# Patient Record
Sex: MALE | Race: WHITE | NOT HISPANIC OR LATINO | Employment: FULL TIME | URBAN - NONMETROPOLITAN AREA
[De-identification: names, ages, dates, MRNs, and addresses within clinical notes are randomized per-mention and may not be internally consistent; named-entity substitution may affect disease eponyms.]

---

## 2018-04-23 ENCOUNTER — OFFICE VISIT CONVERTED (OUTPATIENT)
Dept: FAMILY MEDICINE CLINIC | Age: 58
End: 2018-04-23
Attending: NURSE PRACTITIONER

## 2018-04-24 LAB
ALBUMIN SERPL-MCNC: 4.5 G/DL
ALBUMIN/GLOB SERPL: 2 {RATIO}
ALP SERPL-CCNC: 90 IU/L
ALT SERPL-CCNC: 13 IU/L
AST SERPL-CCNC: 15 IU/L
BASOPHILS # BLD AUTO: 0 X10E3/UL
BASOPHILS NFR BLD AUTO: 1 %
BILIRUB SERPL-MCNC: 0.3 MG/DL
BUN SERPL-MCNC: 15 MG/DL
BUN/CREAT SERPL: 19
CALCIUM SERPL-MCNC: 9.3 MG/DL
CHLORIDE SERPL-SCNC: 102 MMOL/L
CHOLEST SERPL-MCNC: 159 MG/DL
CO2 SERPL-SCNC: 24 MMOL/L
CONV IMMATURE GRAN: 0 %
CONV TOTAL PROTEIN: 6.7 G/DL
CREAT UR-MCNC: 0.8 MG/DL
EOSINOPHIL # BLD AUTO: 0.2 X10E3/UL
EOSINOPHIL # BLD AUTO: 4 %
ERYTHROCYTE [DISTWIDTH] IN BLOOD BY AUTOMATED COUNT: 13.6 %
GLOBULIN UR ELPH-MCNC: 2.2 G/DL
GLUCOSE SERPL-MCNC: 112 MG/DL
HCT VFR BLD AUTO: 50.8 %
HCV AB SER DONR QL: <0.1 S/CO RATIO
HDLC SERPL-MCNC: 44 MG/DL
HGB BLD-MCNC: 17.3 G/DL
IMM GRANULOCYTES # BLD: 0 X10E3/UL
LDLC SERPL CALC-MCNC: 103 MG/DL
LYMPHOCYTES # BLD AUTO: 1.1 X10E3/UL
LYMPHOCYTES NFR BLD AUTO: 19 %
MCH RBC QN AUTO: 29.6 PG
MCHC RBC AUTO-ENTMCNC: 34.1 G/DL
MCV RBC AUTO: 87 FL
MONOCYTES # BLD AUTO: 0.5 X10E3/UL
MONOCYTES NFR BLD AUTO: 8 %
NEUTROPHILS # BLD AUTO: 4 X10E3/UL
NEUTROPHILS NFR BLD AUTO: 68 %
PLATELET # BLD AUTO: 305 X10E3/UL
POTASSIUM SERPL-SCNC: 5 MMOL/L
PSA SERPL-MCNC: 1.2 NG/ML
RBC # BLD AUTO: 5.84 X10E6/UL
SODIUM SERPL-SCNC: 141 MMOL/L
TRIGL SERPL-MCNC: 59 MG/DL
TSH SERPL-ACNC: 1.94 UIU/ML
VLDLC SERPL-MCNC: 12 MG/DL
WBC # BLD AUTO: 5.8 X10E3/UL

## 2018-05-14 ENCOUNTER — OFFICE VISIT CONVERTED (OUTPATIENT)
Dept: FAMILY MEDICINE CLINIC | Age: 58
End: 2018-05-14
Attending: NURSE PRACTITIONER

## 2019-05-10 ENCOUNTER — OFFICE VISIT CONVERTED (OUTPATIENT)
Dept: FAMILY MEDICINE CLINIC | Age: 59
End: 2019-05-10
Attending: NURSE PRACTITIONER

## 2020-03-20 ENCOUNTER — CONVERSION ENCOUNTER (OUTPATIENT)
Dept: FAMILY MEDICINE CLINIC | Age: 60
End: 2020-03-20

## 2020-03-20 ENCOUNTER — OFFICE VISIT CONVERTED (OUTPATIENT)
Dept: FAMILY MEDICINE CLINIC | Age: 60
End: 2020-03-20
Attending: FAMILY MEDICINE

## 2020-03-30 LAB — SARS-COV-2 RNA RESP QL NAA+PROBE: NOT DETECTED

## 2021-05-18 NOTE — PROGRESS NOTES
Teo BarcenasAmber 1960     Office/Outpatient Visit    Visit Date: Mon, May 14, 2018 01:21 pm    Provider: Giuliana Dow N.P. (Assistant: Cristina Baeza MA)    Location: [facility]        Electronically signed by Giuliana Dow N.P. on  2018 02:14:18 PM                             SUBJECTIVE:        CC:     Mr. Barcenas is a 57 year old White male.  Patient is here for instability with his blood pressure. He is seeking treatment to get this regulated.          HPI:         Complaint of blood pressure elevation without a diagnosis of HTN..  The symptom began 2 days ago.  The severity is of severe intensity.  Aggravating factors include emotional stress and having confrontation with a supervisor at work - causes his BP to go high - causing a HA and dizziness.  no chest pain - only happens when he is having to deal with this one particular person     ROS:     CONSTITUTIONAL:  Negative for chills, fatigue and fever.      CARDIOVASCULAR:  Negative for chest pain and pedal edema.      RESPIRATORY:  Negative for recent cough and dyspnea.      NEUROLOGICAL:  Positive for dizziness and headaches.      PSYCHIATRIC:  Positive for anxiety and feelings of stress ( (work with dealing with a supervisor) ).   Negative for crying spells, depression, personality change, difficulty concentrating or suicidal thoughts.          PMH/FMH/SH:     Last Reviewed on 2018 01:55 PM by Giuliana Dow    Past Medical History:             PAST MEDICAL HISTORY         cardiomyopathy     Melanoma: dx'd in ;         PREVENTIVE HEALTH MAINTENANCE             Hepatitis C Medicare Screening: was last done 18; negative         Surgical History:         Appendectomy    Cholecystectomy      melanoma;         Family History:     Father:  at age 68; Cause of death was CVA     Mother: Ovarian Cancer         Social History:     Occupation: [employer]     Marital Status:      Children: 3 children and 2 step-children          Tobacco/Alcohol/Supplements:     Last Reviewed on 5/14/2018 01:22 PM by Cristina Baeza    Tobacco: He has a past history of cigarette smoking; quit date:  2011.   He currently uses smokeless tobacco, 3 pouches of a can per day cans/pouches daily..          Alcohol: Patient has a history of alcoholism in the past. Frequency: Socially         Substance Abuse History:     Last Reviewed on 6/17/2016 09:28 AM by Anne Talley    NEGATIVE         Mental Health History:     Last Reviewed on 6/17/2016 09:28 AM by Anne Talley        Communicable Diseases (eg STDs):     Last Reviewed on 6/17/2016 09:28 AM by Anne Talley            Current Problems:     Last Reviewed on 5/14/2018 01:55 PM by Giuliana Dow    History of malignant melanoma of skin     Low back pain     CAD     Acute reaction to stress, unspecified     Blood pressure elevation without a diagnosis of HTN     Screening for colon cancer         Immunizations:     None        Allergies:     Last Reviewed on 5/14/2018 01:21 PM by Cristina Baeza      No Known Drug Allergies.         Current Medications:     Last Reviewed on 5/14/2018 01:21 PM by Cristina Baeza    None        OBJECTIVE:        Vitals:         Historical:     04/23/2018  BP:   140/88 mm Hg ( (left arm, , sitting, );)     04/23/2018  BP:   140/82 mm Hg ( (left arm, , sitting, );)         Current: 5/14/2018 1:23:41 PM    Ht:  5 ft, 7 in;  Wt: 180.4 lbs;  BMI: 28.3    T: 98.1 F (oral);  BP: 128/77 mm Hg (left arm, sitting);  P: 66 bpm (left arm (BP Cuff), sitting);  sCr: 0.8 mg/dL;  GFR: 94.53        Exams:     PHYSICAL EXAM:     GENERAL: Vitals recorded well developed, well nourished;  no apparent distress;     NECK: range of motion is normal;     RESPIRATORY: normal appearance and symmetric expansion of chest wall; normal respiratory rate and pattern with no distress; normal breath sounds with no rales, rhonchi, wheezes or rubs;     CARDIOVASCULAR: normal rate; rhythm is  regular;     LYMPHATIC: no enlargement of cervical or facial nodes; no supraclavicular nodes;     MUSCULOSKELETAL: normal gait; normal range of motion of all major muscle groups; no limb or joint pain with range of motion;     NEUROLOGIC: mental status: alert and oriented x 3;     PSYCHIATRIC: appropriate affect and demeanor; normal speech pattern; normal thought and perception;         ASSESSMENT           796.2   R03.0  Blood pressure elevation without a diagnosis of HTN              DDx:     308.9   F43.0  Acute reaction to stress, unspecified              DDx:         PLAN:          Blood pressure elevation without a diagnosis of HTN monitor BP at home - I do not see a trend of elevation - he has had one elevation here  - I do recommend that he monitor and bring a log in in 3 months unless staying elevated over 140/90 - He feels that if he is able to have a break from work unti the current situation is resolved, this may help him out          Acute reaction to stress, unspecified     School/Work Excuse for Off work 5/13 - 5/18/18  RTW Saturday 5/19/18             CHARGE CAPTURE           **Please note: ICD descriptions below are intended for billing purposes only and may not represent clinical diagnoses**        Primary Diagnosis:         796.2 Blood pressure elevation without a diagnosis of HTN            R03.0    Elevated blood-pressure reading, without diagnosis of hypertension              Orders:          53633   Office/outpatient visit; established patient, level 3  (In-House)           308.9 Acute reaction to stress, unspecified            F43.0    Acute stress reaction

## 2021-05-18 NOTE — PROGRESS NOTES
"Teo Barcenas 1960     Office/Outpatient Visit    Visit Date: Mon, Apr 23, 2018 09:44 am    Provider: Loree Baeza N.P. (Assistant: Belinda Elias MA)    Location: [facility]        Electronically signed by Loree Baeza N.P. on  04/23/2018 12:50:33 PM                             SUBJECTIVE:        CC:     Mr. Barcenas is a 57 year old White male.  He is here for an annual exam.          HPI:         HEALTH RISK PROFILE (\"At Risk\" items are starred):     Weight: ** Overweight (BMI 27-29%);     Blood Pressure: ** Borderline (-140, DBP 80-90);     Lipids: Never had cholesterol screening test;     Smoking: ** Past history of tobacco use; Currently uses dip; Depression screen is performed and is negative.      Cancer Screening: ** Has never had a screening flexible sigmoidoscopy; ** (+) Family history of colon cancer;     Immunization Status: Up to date;     Nutrition: ** Weight is above the healthy range for height; eats fast food;     Physical Activity: ** Exercises infrequently;     Alcohol/Drug Use: Rarely drinks alcohol;     Safety: Always wears seatbelt;  Never drinks alcohol and then drives;  Guns are kept locked up, and ammunition is stored in a separate location;  Smoke detectors are present in the home;     Dental Health: Brushes daily; ** Uses tobacco products;     Skin Exams: ** Does not use sunscreen;     ROS:     CONSTITUTIONAL:  Negative for chills and fever.      EYES:  Negative for blurred vision.      E/N/T:  Negative for nasal congestion and sore throat.      CARDIOVASCULAR:  Negative for chest pain and palpitations.      RESPIRATORY:  Negative for recent cough and dyspnea.      GASTROINTESTINAL:  Negative for abdominal pain, nausea and vomiting.      MUSCULOSKELETAL:  Negative for myalgias.      INTEGUMENTARY:  Positive for rash (facial rash, chronic, left arm itches).   Negative for atypical mole(s).      NEUROLOGICAL:  Negative for paresthesias and weakness.      " PSYCHIATRIC:  Positive for feelings of stress ( (work related) ).   Negative for anxiety or depression.          PMH/FMH/SH:     Last Reviewed on 4/23/2018 10:55 AM by Loree Baeza    Past Medical History:             PAST MEDICAL HISTORY         Myocardial Infarction     Melanoma: dx'd in 2012;         Surgical History:         Appendectomy    Cholecystectomy      melanoma;         Family History:     Father: Cause of death was CAD     Mother: Ovarian Cancer         Social History:     Occupation: [employer]     Marital Status:      Children: 3 children and 2 step-children         Tobacco/Alcohol/Supplements:     Last Reviewed on 4/23/2018 09:50 AM by Belinda Elias    Tobacco: He has a past history of cigarette smoking; quit date:  2011.   Currently uses smokeless tobacco.          Alcohol: Patient has a history of alcoholism in the past. Frequency: Socially             Immunizations:     None        Allergies:     Last Reviewed on 4/23/2018 10:50 AM by Loree Baeza      No Known Drug Allergies.         Current Medications:     Last Reviewed on 4/23/2018 10:50 AM by Loree Baeza    None        OBJECTIVE:        Vitals:         Current: 4/23/2018 9:49:49 AM    Ht:  5 ft, 7 in;  Wt: 183.6 lbs;  BMI: 28.8    T: 97.4 F (oral);  BP: 140/82 mm Hg (left arm, sitting);  P: 63 bpm (left arm (BP Cuff), sitting);  sCr: 1 mg/dL;  GFR: 76.19        Repeat:     10:56:05 AM     BP:   140/88mm Hg (left arm, sitting)         Exams:     PHYSICAL EXAM:     GENERAL: Vitals recorded well developed, well nourished;  well groomed;  no apparent distress;     EYES: extraocular movements intact; conjunctiva and cornea are normal; PERRLA;     E/N/T: OROPHARYNX:  normal mucosa, dentition, gingiva, and posterior pharynx;     NECK: range of motion is normal; thyroid exam reveals not enlarged;     RESPIRATORY: normal respiratory rate and pattern with no distress; normal breath sounds with no rales, rhonchi, wheezes or rubs;      CARDIOVASCULAR: normal rate; rhythm is regular;  no systolic murmur; no edema;     GASTROINTESTINAL: nontender; normal bowel sounds; no organomegaly;     LYMPHATIC: no enlargement of cervical or facial nodes;     SKIN: a rash is noted on the face ( forehead and nasolabial folds );  the color is mainly red;  it is best characterized as maculopapular;  15 cm Scar present on left flank;     MUSCULOSKELETAL: normal gait; normal overall tone     NEUROLOGIC: mental status: alert and oriented x 3; GROSSLY INTACT     PSYCHIATRIC:  appropriate affect and demeanor; normal speech pattern; grossly normal memory;         ASSESSMENT           V70.0   Z00.00  Annual exam              DDx:     V76.49   Z12.11  Screening for colon cancer              DDx:     V10.82   Z85.820  History of malignant melanoma of skin              DDx:     V69.8   Z72.89  Other problems related to lifestyle              DDx:         ORDERS:         Lab Orders:       [id number]  [id number] [facility] CBC with Differential/Platelet  (Send-Out)         [id number]  [id number] [facility] Comp Metabolic Panel (14)  (Send-Out)         [id number]  [id number] [facility] Lipid Panel (Excludes LDL/HDL ratio)  (Send-Out)         [id number]  [id number] [facility] Prostate-Specific Ag, Serum  (Send-Out)         [id number]  [id number] [facility] TSH  (Send-Out)         [id number]  [id number] [facility] Hepatitic C (HCV) Antibody Medicare screen  (Send-Out)           Procedures Ordered:       REFER  Referral to Specialist or Other Facility  (Send-Out)         REFER  Referral to Specialist or Other Facility  (Send-Out)                   PLAN:          Annual exam     LABORATORY:  Labs ordered to be performed today at [facility] include CBC, PSA.  CMP Lipid panel TSH MIPS PHQ-9 Depression Screening: Completed form scanned and in chart; Total Score 0     COUNSELING was provided today regarding the following topics: healthy eating habits, regular exercise, use of seat belts, use of sunscreen, and stop smokeless tobacco.      FOLLOW-UP: Schedule  follow-up appointments on a p.r.n. basis.            Orders:       [id number]  [id number] [facility] CBC with Differential/Platelet  (Send-Out)         [id number]  [id number] [facility] Comp Metabolic Panel (14)  (Send-Out)         94145  [id number] [facility] Lipid Panel (Excludes LDL/HDL ratio)  (Send-Out)         [id number]  [id number] [facility] Prostate-Specific Ag, Serum  (Send-Out)         [id number]  [id number] [facility] TSH  (Send-Out)             Patient Education Handouts:       Physical Exam 50-59 year, Male           Screening for colon cancer pt has family history of colon cancer (uncle)         REFERRALS:  Referral initiated to a general surgeon ( Dr. Javon Brush ).            Orders:       REFER  Referral to Specialist or Other Facility  (Send-Out)            History of malignant melanoma of skin Pt has seen Dr Yañez in the past but has not followed up like instructed after melanoma dx. and to have his rash evaluated         REFERRALS:  Referral initiated to a dermatologist ( Dr. Cari Yañez ).            Orders:       REFER  Referral to Specialist or Other Facility  (Send-Out)            Other problems related to lifestyle     LABORATORY:  Labs ordered to be performed today at [facility] include Hepatitis C Screening.            Orders:       [id number]  [id number] [facility] Hepatitic C (HCV) Antibody Medicare screen  (Send-Out)               Patient Recommendations:        For  Annual exam:     Limit dietary intake of fat (especially saturated fat) and cholesterol.  Eat a variety of foods, including plenty of fruits, vegetables, and grain containg fiber, limit fat intake to 30% of total calories. Balance caloric intake with energy expended. Maintaining regular physical activity is advised to help prevent heart disease, hypertension, diabetes, and obesity. Always use shoulder/lap restraints when driving or riding in a vehicle, even those equipped with air bags.  Schedule follow-up appointments as needed.              CHARGE CAPTURE           **Please note: ICD  descriptions below are intended for billing purposes only and may not represent clinical diagnoses**        Primary Diagnosis:         V70.0 Annual exam            Z00.00    Encounter for general adult medical examination without abnormal findings              Orders:          [id number]   Preventive medicine, established patient, age 40-64 years  (In-House)           V76.49 Screening for colon cancer            Z12.11    Encounter for screening for malignant neoplasm of colon    V10.82 History of malignant melanoma of skin            Z85.820    Personal history of malignant melanoma of skin    V69.8 Other problems related to lifestyle            Z72.89    Other problems related to lifestyle        ADDENDUMS:      ____________________________________    Date: 04/24/2018 10:29 AM    Author: Felisha Moreno         Visit Note Faxed to:        Javon Brush  (General Surgery); Number [phone]

## 2021-05-18 NOTE — PROGRESS NOTES
Teo BarcenasAmber 1960     Office/Outpatient Visit    Visit Date: Fri, May 10, 2019 01:16 pm    Provider: Светлана Ramos N.P. (Assistant: Vashti Pink LPN)    Location: [facility]        Electronically signed by Светлана Ramos N.P. on  05/10/2019 01:53:35 PM                             SUBJECTIVE:        CC:     Mr. Barcenas is a 58 year old White male.  Back pain that has been present x 2 days. No obvious injuries known.          HPI:         Mr. Barcenas presents with back pain.  The discomfort is most prominent in the mid lumbar spine.  This radiates to the left posterior thigh.  He characterizes it as stabbing.  This is a chronic, but intermittent problem with an acute exacerbation.  He states that the current episode of pain started 2 days ago.  Aggravating factors contributing to the back pain may be job-related repetitive lifting with back strain and lifting grandchild out of bathtub.  He notes some pain relief with apple cider vinegar.      ROS:     CONSTITUTIONAL:  Negative for chills and fever.      CARDIOVASCULAR:  Negative for chest pain and palpitations.      RESPIRATORY:  Negative for dyspnea and frequent wheezing.      GASTROINTESTINAL:  Negative for abdominal pain and vomiting.      MUSCULOSKELETAL:  Positive for back pain.      NEUROLOGICAL:  Negative for dizziness and headaches.          PMH/FMH/SH:     Last Reviewed on 2018 01:55 PM by Giuliana Dow    Past Medical History:             PAST MEDICAL HISTORY         cardiomyopathy     Melanoma: dx'd in ;         PREVENTIVE HEALTH MAINTENANCE             Hepatitis C Medicare Screening: was last done 18; negative         Surgical History:         Appendectomy    Cholecystectomy      melanoma;         Family History:     Father:  at age 68; Cause of death was CVA     Mother: Ovarian Cancer         Social History:     Occupation: [employer]     Marital Status:      Children: 3 children and 2 step-children          Tobacco/Alcohol/Supplements:     Last Reviewed on 5/14/2018 01:22 PM by Cristina Baeza    Tobacco: He has a past history of cigarette smoking; quit date:  2011.   He currently uses smokeless tobacco, 3 pouches of a can per day cans/pouches daily..          Alcohol: Patient has a history of alcoholism in the past. Frequency: Socially         Substance Abuse History:     Last Reviewed on 6/17/2016 09:28 AM by Anne Talley    NEGATIVE         Mental Health History:     Last Reviewed on 6/17/2016 09:28 AM by Anne Talley        Communicable Diseases (eg STDs):     Last Reviewed on 6/17/2016 09:28 AM by Anne Talley            Current Problems:     Last Reviewed on 5/14/2018 01:55 PM by Giuliana Dow    History of malignant melanoma of skin     Low back pain     CAD         Immunizations:     None        Allergies:     Last Reviewed on 5/10/2019 01:17 PM by Vashti Pink      No Known Drug Allergies.         Current Medications:     Last Reviewed on 5/10/2019 01:17 PM by Vashti Pink      No Known Medications.         OBJECTIVE:        Vitals:         Current: 5/10/2019 1:19:54 PM    Ht:  5 ft, 7 in;  Wt: 185.8 lbs;  BMI: 29.1    T: 97.8 F (oral);  BP: 139/85 mm Hg (left arm, sitting);  P: 61 bpm (left arm (BP Cuff), sitting);  sCr: 0.8 mg/dL;  GFR: 94.59        Exams:     PHYSICAL EXAM:     GENERAL: well developed, well nourished;  no apparent distress;     RESPIRATORY: normal respiratory rate and pattern with no distress; normal breath sounds with no rales, rhonchi, wheezes or rubs;     CARDIOVASCULAR: normal rate; rhythm is regular;     GASTROINTESTINAL: nontender; normal bowel sounds; no organomegaly;     MUSCULOSKELETAL: gait: slowed;  decreased range of motion noted in: back flexion, extension, and lateral flexion;  pain with range of motion in: back flexion, extension, and lateral flexion;     NEUROLOGIC: mental status: alert and oriented x 3; GROSSLY INTACT     PSYCHIATRIC: appropriate  affect and demeanor;         Procedures:     Back pain     1. Kenalog 40 mg given IM in the right hip; administered by AS;  lot number [id number]; expires 12/2019             ASSESSMENT           724.5   M54.9  Back pain              DDx:         ORDERS:         Meds Prescribed:       Diclofenac Sodium 75mg Tablets, Enteric Coated 1 tab bid with food prn pain.  do not take ibuprofen or tylenol while taking this medication.  #60 (Sixty) tablet(s) Refills: 0       Robaxin (Methocarbamol) 500mg Tablet take 1-2 tabs PO q6hrs/prn  #40 (Forty) tablet(s) Refills: 0         Other Orders:       [id number]  Therapeutic injection  (In-House)           Kenalog, per 10 mg (x4)                 PLAN:          Back pain Consider imaging, physical therapy if no improvement in symptoms.     Steroids Kenalog 40 mg 1 ml     FOLLOW-UP: Schedule follow-up appointments on a p.r.n. basis. Chronic visit follow up School/Work Excuse for Today Tomorrow Sunday, Monday     RECOMMENDATIONS given include: alternating cold packs and moist heat.            Prescriptions: Advised that muscle relaxer may cause drowsiness.       Diclofenac Sodium 75mg Tablets, Enteric Coated 1 tab bid with food prn pain.  do not take ibuprofen or tylenol while taking this medication.  #60 (Sixty) tablet(s) Refills: 0       Robaxin (Methocarbamol) 500mg Tablet take 1-2 tabs PO q6hrs/prn  #40 (Forty) tablet(s) Refills: 0           Orders:       [id number]  Therapeutic injection  (In-House)                     Kenalog, per 10 mg (x4)             Patient Recommendations:        For  Back pain:     Alternate cold packs and moist heat for pain relief.  Schedule follow-up appointments as needed.              CHARGE CAPTURE           **Please note: ICD descriptions below are intended for billing purposes only and may not represent clinical diagnoses**        Primary Diagnosis:         724.5 Back pain            M54.9    Dorsalgia, unspecified              Orders:          [id number]    Office/outpatient visit; established patient, level 3  (In-House)             [id number]   Therapeutic injection  (In-House)                                           Kenalog, per 10 mg (x4)
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
## 2021-07-01 VITALS
TEMPERATURE: 98.1 F | DIASTOLIC BLOOD PRESSURE: 77 MMHG | WEIGHT: 180.4 LBS | SYSTOLIC BLOOD PRESSURE: 128 MMHG | HEART RATE: 66 BPM | HEIGHT: 67 IN | BODY MASS INDEX: 28.31 KG/M2

## 2021-07-01 VITALS
BODY MASS INDEX: 29.16 KG/M2 | TEMPERATURE: 97.8 F | DIASTOLIC BLOOD PRESSURE: 85 MMHG | HEART RATE: 61 BPM | HEIGHT: 67 IN | SYSTOLIC BLOOD PRESSURE: 139 MMHG | WEIGHT: 185.8 LBS

## 2021-07-01 VITALS
HEART RATE: 63 BPM | HEIGHT: 67 IN | DIASTOLIC BLOOD PRESSURE: 88 MMHG | SYSTOLIC BLOOD PRESSURE: 140 MMHG | WEIGHT: 183.6 LBS | TEMPERATURE: 97.4 F | BODY MASS INDEX: 28.82 KG/M2

## 2021-12-10 ENCOUNTER — TELEPHONE (OUTPATIENT)
Dept: FAMILY MEDICINE CLINIC | Age: 61
End: 2021-12-10

## 2021-12-10 ENCOUNTER — TRANSITIONAL CARE MANAGEMENT TELEPHONE ENCOUNTER (OUTPATIENT)
Dept: FAMILY MEDICINE CLINIC | Age: 61
End: 2021-12-10

## 2021-12-10 RX ORDER — OXYCODONE AND ACETAMINOPHEN 7.5; 325 MG/1; MG/1
2 TABLET ORAL EVERY 4 HOURS PRN
COMMUNITY
Start: 2021-12-09 | End: 2022-08-26

## 2021-12-10 RX ORDER — ASPIRIN 325 MG
325 TABLET ORAL 2 TIMES DAILY
COMMUNITY

## 2021-12-10 RX ORDER — CYCLOBENZAPRINE HCL 10 MG
10 TABLET ORAL 3 TIMES DAILY PRN
COMMUNITY
Start: 2021-12-09 | End: 2021-12-13 | Stop reason: ALTCHOICE
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
## 2021-12-13 ENCOUNTER — LAB (OUTPATIENT)
Dept: LAB | Facility: HOSPITAL | Age: 61
End: 2021-12-13

## 2021-12-13 ENCOUNTER — OFFICE VISIT (OUTPATIENT)
Dept: FAMILY MEDICINE CLINIC | Age: 61
End: 2021-12-13

## 2021-12-13 VITALS — SYSTOLIC BLOOD PRESSURE: 130 MMHG | DIASTOLIC BLOOD PRESSURE: 81 MMHG | HEART RATE: 82 BPM

## 2021-12-13 DIAGNOSIS — R74.01 ELEVATED ALANINE AMINOTRANSFERASE (ALT) LEVEL: ICD-10-CM

## 2021-12-13 DIAGNOSIS — S72.001A CLOSED RIGHT HIP FRACTURE, INITIAL ENCOUNTER (HCC): Primary | ICD-10-CM

## 2021-12-13 DIAGNOSIS — M25.561 ACUTE PAIN OF RIGHT KNEE: ICD-10-CM

## 2021-12-13 DIAGNOSIS — M62.838 MUSCLE SPASMS OF LOWER EXTREMITY, UNSPECIFIED LATERALITY: ICD-10-CM

## 2021-12-13 LAB
ALBUMIN SERPL-MCNC: 3.7 G/DL (ref 3.5–5.2)
ALBUMIN/GLOB SERPL: 1.3 G/DL
ALP SERPL-CCNC: 192 U/L (ref 39–117)
ALT SERPL W P-5'-P-CCNC: 35 U/L (ref 1–41)
ANION GAP SERPL CALCULATED.3IONS-SCNC: 7.2 MMOL/L (ref 5–15)
AST SERPL-CCNC: 21 U/L (ref 1–40)
BILIRUB SERPL-MCNC: 0.3 MG/DL (ref 0–1.2)
BUN SERPL-MCNC: 15 MG/DL (ref 8–23)
BUN/CREAT SERPL: 27.3 (ref 7–25)
CALCIUM SPEC-SCNC: 9 MG/DL (ref 8.6–10.5)
CHLORIDE SERPL-SCNC: 102 MMOL/L (ref 98–107)
CO2 SERPL-SCNC: 29.8 MMOL/L (ref 22–29)
CREAT SERPL-MCNC: 0.55 MG/DL (ref 0.76–1.27)
GFR SERPL CREATININE-BSD FRML MDRD: >150 ML/MIN/1.73
GLOBULIN UR ELPH-MCNC: 2.9 GM/DL
GLUCOSE SERPL-MCNC: 109 MG/DL (ref 65–99)
POTASSIUM SERPL-SCNC: 4.2 MMOL/L (ref 3.5–5.2)
PROT SERPL-MCNC: 6.6 G/DL (ref 6–8.5)
SODIUM SERPL-SCNC: 139 MMOL/L (ref 136–145)

## 2021-12-13 PROCEDURE — 80053 COMPREHEN METABOLIC PANEL: CPT

## 2021-12-13 PROCEDURE — 99495 TRANSJ CARE MGMT MOD F2F 14D: CPT | Performed by: NURSE PRACTITIONER

## 2021-12-13 PROCEDURE — 1111F DSCHRG MED/CURRENT MED MERGE: CPT | Performed by: NURSE PRACTITIONER

## 2021-12-13 PROCEDURE — 36415 COLL VENOUS BLD VENIPUNCTURE: CPT

## 2021-12-13 RX ORDER — CHOLECALCIFEROL (VITAMIN D3) 125 MCG
5 CAPSULE ORAL NIGHTLY PRN
COMMUNITY
Start: 2021-11-24 | End: 2023-03-31

## 2021-12-13 RX ORDER — METHOCARBAMOL 500 MG/1
500 TABLET, FILM COATED ORAL 3 TIMES DAILY PRN
Qty: 60 TABLET | Refills: 1 | Status: SHIPPED | OUTPATIENT
Start: 2021-12-13
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
## 2021-12-13 NOTE — ASSESSMENT & PLAN NOTE
Reviewed chart, EMD chart, hospital records, keep appt tomorrow with ortho  He is trying to get set up for PT, home PT not available, so is trying to get set up with [facility], he is doing some home exercises he was taught

## 2021-12-14 DIAGNOSIS — M62.838 MUSCLE SPASMS OF LOWER EXTREMITY, UNSPECIFIED LATERALITY: Primary | ICD-10-CM

## 2021-12-22 ENCOUNTER — TELEPHONE (OUTPATIENT)
Dept: FAMILY MEDICINE CLINIC | Age: 61
End: 2021-12-22
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
## 2021-12-22 NOTE — TELEPHONE ENCOUNTER
Caller: Teo Barcenas    Relationship: Self    Best call back number: [phone]    What is the best time to reach you: ANY    Who are you requesting to speak with (clinical staff, provider,  specific staff member): CLINICAL STAFF    What was the call regarding: PATIENT WOULD LIKE TO SPEAK WITH A NURSE ABOUT HIS MEDICATION. HIS LEG CRAMPS ARE KEEPING HIM UP AT NIGHT.    Do you require a callback: YES

## 2021-12-23 NOTE — TELEPHONE ENCOUNTER
Pt said he saw Ortho Dr Otto Castillo at [facility] on 12/20/21 and he told him he looked good. He sees him back in 4 weeks. He said he mentioned to him about spasms but he didn't say anything about them. He said he sleep some better last night and didn't have leg spasms. He had been sleeping on his back and that's not normal for him. He slept on his side last night and slightly bent his leg. Advised him that if he didn't continue to improve let us know and we will set him up an appt.

## 2021-12-23 NOTE — TELEPHONE ENCOUNTER
Did he see ortho for follow up? Is he getting PT?  May need to add something for sleep.  I just saw her for this CHARMAINE visit but had not seen him since 2018, follow up and we can decide on rx to help him sleep and see how his spasms are doing then

## 2022-05-25 ENCOUNTER — TELEPHONE (OUTPATIENT)
Dept: FAMILY MEDICINE CLINIC | Age: 62
End: 2022-05-25
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
## 2022-08-26 ENCOUNTER — OFFICE VISIT (OUTPATIENT)
Dept: FAMILY MEDICINE CLINIC | Age: 62
End: 2022-08-26

## 2022-08-26 VITALS
DIASTOLIC BLOOD PRESSURE: 77 MMHG | HEART RATE: 80 BPM | OXYGEN SATURATION: 94 % | TEMPERATURE: 98.9 F | WEIGHT: 202.4 LBS | BODY MASS INDEX: 31.77 KG/M2 | HEIGHT: 67 IN | SYSTOLIC BLOOD PRESSURE: 127 MMHG

## 2022-08-26 DIAGNOSIS — U07.1 COVID-19: ICD-10-CM

## 2022-08-26 DIAGNOSIS — R05.9 COUGH: Primary | ICD-10-CM

## 2022-08-26 LAB
EXPIRATION DATE: ABNORMAL
FLUAV AG UPPER RESP QL IA.RAPID: NOT DETECTED
FLUBV AG UPPER RESP QL IA.RAPID: NOT DETECTED
INTERNAL CONTROL: ABNORMAL
Lab: ABNORMAL
SARS-COV-2 AG UPPER RESP QL IA.RAPID: DETECTED

## 2022-08-26 PROCEDURE — 87428 SARSCOV & INF VIR A&B AG IA: CPT | Performed by: NURSE PRACTITIONER

## 2022-08-26 PROCEDURE — 99213 OFFICE O/P EST LOW 20 MIN: CPT | Performed by: NURSE PRACTITIONER

## 2022-08-26 RX ORDER — BROMPHENIRAMINE MALEATE, PSEUDOEPHEDRINE HYDROCHLORIDE, AND DEXTROMETHORPHAN HYDROBROMIDE 2; 30; 10 MG/5ML; MG/5ML; MG/5ML
5 SYRUP ORAL 4 TIMES DAILY PRN
Qty: 118 ML | Refills: 0 | Status: SHIPPED | OUTPATIENT
Start: 2022-08-26 | End: 2022-09-02
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
## 2023-03-31 ENCOUNTER — OFFICE VISIT (OUTPATIENT)
Dept: FAMILY MEDICINE CLINIC | Age: 63
End: 2023-03-31
Payer: COMMERCIAL

## 2023-03-31 VITALS
BODY MASS INDEX: 32.62 KG/M2 | DIASTOLIC BLOOD PRESSURE: 93 MMHG | HEIGHT: 67 IN | HEART RATE: 68 BPM | OXYGEN SATURATION: 96 % | SYSTOLIC BLOOD PRESSURE: 155 MMHG | WEIGHT: 207.8 LBS

## 2023-03-31 DIAGNOSIS — T23.322A FULL THICKNESS BURN OF LEFT RING FINGER: Primary | ICD-10-CM
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
## 2023-10-11 ENCOUNTER — OFFICE VISIT (OUTPATIENT)
Dept: FAMILY MEDICINE CLINIC | Age: 63
End: 2023-10-11
Payer: COMMERCIAL

## 2023-10-11 VITALS
DIASTOLIC BLOOD PRESSURE: 78 MMHG | BODY MASS INDEX: 31.83 KG/M2 | OXYGEN SATURATION: 97 % | TEMPERATURE: 98.2 F | WEIGHT: 202.8 LBS | HEART RATE: 65 BPM | HEIGHT: 67 IN | SYSTOLIC BLOOD PRESSURE: 131 MMHG

## 2023-10-11 DIAGNOSIS — R05.9 COUGH, UNSPECIFIED TYPE: ICD-10-CM

## 2023-10-11 DIAGNOSIS — J02.9 SORE THROAT: Primary | ICD-10-CM

## 2023-10-11 LAB
EXPIRATION DATE: NORMAL
EXPIRATION DATE: NORMAL
FLUAV AG UPPER RESP QL IA.RAPID: NOT DETECTED
FLUBV AG UPPER RESP QL IA.RAPID: NOT DETECTED
INTERNAL CONTROL: NORMAL
INTERNAL CONTROL: NORMAL
Lab: NORMAL
Lab: NORMAL
S PYO AG THROAT QL: NEGATIVE
SARS-COV-2 AG UPPER RESP QL IA.RAPID: NOT DETECTED

## 2023-10-11 PROCEDURE — 99213 OFFICE O/P EST LOW 20 MIN: CPT | Performed by: NURSE PRACTITIONER

## 2023-10-11 PROCEDURE — 87880 STREP A ASSAY W/OPTIC: CPT | Performed by: NURSE PRACTITIONER

## 2023-10-11 PROCEDURE — 87081 CULTURE SCREEN ONLY: CPT | Performed by: NURSE PRACTITIONER

## 2023-10-11 NOTE — PROGRESS NOTES
"Teo Barcenas presents to [facility].    Chief Complaint:  Cough (Cough, Chills, Body aches x 2 days. )         History of Present Illness:  URI  When did symptoms started 2 days ago   Any exposures:wife sick, she has not been tested   Symptoms: dry cough, chills and body aches   Treatment tried:Tylenol     PAST MEDICAL HISTORY changes since :     Covid +    cardiomyopathy   Melanoma: dx'd in ;      PREVENTIVE HEALTH MAINTENANCE         COLORECTAL CANCER SCREENING: Up to date (colonoscopy q10y; sigmoidoscopy q5y; Cologuard q3y) was last done 18, Results are in chart; colonoscopy with normal results   Hepatitis C Medicare Screening: was last done 18; negative      Surgical History:      Appendectomy  Cholecystectomy   melanoma;   Right hip fracture surgeries      Family History:   Father:  at age 68; Cause of death was CVA   Mother: Ovarian Cancer      Social History:   Occupation: [employer]   Marital Status:    Children: 3 children and 2 step-children              Review of Systems:  Review of Systems   Constitutional:  Negative for fever.   HENT:  Positive for sore throat.    Respiratory:  Positive for cough. Negative for shortness of breath.    Cardiovascular:  Negative for chest pain.          Current Outpatient Medications:     aspirin 325 MG tablet, Take 1 tablet by mouth Daily., Disp: , Rfl:     Vital Signs:   Vitals:    10/11/23 1058   BP: 131/78   BP Location: Right arm   Patient Position: Sitting   Cuff Size: Large Adult   Pulse: 65   Temp: 98.2 øF (36.8 øC)   TempSrc: Oral   SpO2: 97%   Weight: 92 kg (202 lb 12.8 oz)   Height: 170.2 cm (67.01\")              Physical Exam:  Physical Exam  Constitutional:       General: He is not in acute distress.     Appearance: Normal appearance.   HENT:      Right Ear: Tympanic membrane, ear canal and external ear normal.      Left Ear: Tympanic membrane, ear canal and external ear normal.      Nose: " Nose normal.      Mouth/Throat:      Pharynx: Oropharynx is clear. No posterior oropharyngeal erythema.   Cardiovascular:      Rate and Rhythm: Normal rate and regular rhythm.      Heart sounds: No murmur heard.  Pulmonary:      Effort: Pulmonary effort is normal.      Breath sounds: Normal breath sounds.   Lymphadenopathy:      Cervical: No cervical adenopathy.   Neurological:      Mental Status: He is alert.   Psychiatric:         Mood and Affect: Mood normal.         Behavior: Behavior normal.         Result Review      The following data was reviewed by: MIRANDA Shi on 10/11/2023:    Results for orders placed or performed in visit on 10/11/23   POCT SARS-CoV-2 Antigen PERLA + Flu    Specimen: Swab   Result Value Ref Range    SARS Antigen Not Detected Not Detected, Presumptive Negative    Influenza A Antigen PERLA Not Detected Not Detected    Influenza B Antigen PERLA Not Detected Not Detected    Internal Control Passed Passed    Lot Number [id number]     Expiration Date 8/7/24    POCT rapid strep A    Specimen: Swab   Result Value Ref Range    Rapid Strep A Screen Negative Negative, VALID, INVALID, Not Performed    Internal Control Passed Passed    Lot Number [id number]     Expiration Date 12/31/24                Assessment and Plan:          Diagnoses and all orders for this visit:    1. Sore throat (Primary)  Assessment & Plan:  RSS negative, TC pending   Warm salt water gargles    Orders:  -     POCT rapid strep A    2. Cough, unspecified type  Assessment & Plan:  Rest, increase fluids, follow up if symptoms progress or change   Covid screen: neg  Flu screen: neg  continue tylenol, try mucinex, mucinex dm, zyrtec       Orders:  -     POCT SARS-CoV-2 Antigen PERLA + Flu                 Follow Up   Return if symptoms worsen or fail to improve.  Patient was given instructions and counseling regarding his condition or for health maintenance advice. Please see specific information pulled into the AVS if  appropriate.

## 2023-10-11 NOTE — ASSESSMENT & PLAN NOTE
Rest, increase fluids, follow up if symptoms progress or change   Covid screen: neg  Flu screen: neg  continue tylenol, try mucinex, mucinex dm, zyrtec

## 2023-10-13 LAB — BACTERIA SPEC AEROBE CULT: NORMAL

## 2024-03-19 ENCOUNTER — OFFICE VISIT (OUTPATIENT)
Dept: FAMILY MEDICINE CLINIC | Age: 64
End: 2024-03-19
Payer: COMMERCIAL

## 2024-03-19 ENCOUNTER — LAB (OUTPATIENT)
Dept: LAB | Facility: HOSPITAL | Age: 64
End: 2024-03-19
Payer: COMMERCIAL

## 2024-03-19 VITALS
TEMPERATURE: 98 F | DIASTOLIC BLOOD PRESSURE: 87 MMHG | SYSTOLIC BLOOD PRESSURE: 130 MMHG | HEART RATE: 65 BPM | WEIGHT: 203.4 LBS | BODY MASS INDEX: 31.92 KG/M2 | HEIGHT: 67 IN

## 2024-03-19 DIAGNOSIS — Z79.899 HIGH RISK MEDICATION USE: ICD-10-CM

## 2024-03-19 DIAGNOSIS — M62.838 MUSCLE SPASMS OF LOWER EXTREMITY, UNSPECIFIED LATERALITY: ICD-10-CM

## 2024-03-19 DIAGNOSIS — M79.604 LEG PAIN, LATERAL, RIGHT: Primary | ICD-10-CM

## 2024-03-19 DIAGNOSIS — M54.50 ACUTE LOW BACK PAIN WITHOUT SCIATICA, UNSPECIFIED BACK PAIN LATERALITY: ICD-10-CM

## 2024-03-19 LAB
ALBUMIN SERPL-MCNC: 4.1 G/DL (ref 3.5–5.2)
ALBUMIN/GLOB SERPL: 1.8 G/DL
ALP SERPL-CCNC: 99 U/L (ref 39–117)
ALT SERPL W P-5'-P-CCNC: 15 U/L (ref 1–41)
ANION GAP SERPL CALCULATED.3IONS-SCNC: 7 MMOL/L (ref 5–15)
AST SERPL-CCNC: 12 U/L (ref 1–40)
BILIRUB SERPL-MCNC: 0.2 MG/DL (ref 0–1.2)
BILIRUB UR QL STRIP: NEGATIVE
BUN SERPL-MCNC: 14 MG/DL (ref 8–23)
BUN/CREAT SERPL: 15.9 (ref 7–25)
CALCIUM SPEC-SCNC: 8.8 MG/DL (ref 8.6–10.5)
CHLORIDE SERPL-SCNC: 105 MMOL/L (ref 98–107)
CLARITY UR: CLEAR
CO2 SERPL-SCNC: 28 MMOL/L (ref 22–29)
COLOR UR: YELLOW
CREAT SERPL-MCNC: 0.88 MG/DL (ref 0.76–1.27)
EGFRCR SERPLBLD CKD-EPI 2021: 96 ML/MIN/1.73
GLOBULIN UR ELPH-MCNC: 2.3 GM/DL
GLUCOSE SERPL-MCNC: 110 MG/DL (ref 65–99)
GLUCOSE UR STRIP-MCNC: NEGATIVE MG/DL
HGB UR QL STRIP.AUTO: NEGATIVE
KETONES UR QL STRIP: NEGATIVE
LEUKOCYTE ESTERASE UR QL STRIP.AUTO: NEGATIVE
MAGNESIUM SERPL-MCNC: 2.1 MG/DL (ref 1.6–2.4)
NITRITE UR QL STRIP: NEGATIVE
PH UR STRIP.AUTO: 5.5 [PH] (ref 5–8)
POTASSIUM SERPL-SCNC: 4.7 MMOL/L (ref 3.5–5.2)
PROT SERPL-MCNC: 6.4 G/DL (ref 6–8.5)
PROT UR QL STRIP: NEGATIVE
SODIUM SERPL-SCNC: 140 MMOL/L (ref 136–145)
SP GR UR STRIP: >=1.03 (ref 1–1.03)
UROBILINOGEN UR QL STRIP: NORMAL

## 2024-03-19 PROCEDURE — 36415 COLL VENOUS BLD VENIPUNCTURE: CPT

## 2024-03-19 PROCEDURE — 81003 URINALYSIS AUTO W/O SCOPE: CPT | Performed by: NURSE PRACTITIONER

## 2024-03-19 PROCEDURE — 83735 ASSAY OF MAGNESIUM: CPT

## 2024-03-19 PROCEDURE — 99214 OFFICE O/P EST MOD 30 MIN: CPT | Performed by: NURSE PRACTITIONER

## 2024-03-19 PROCEDURE — 80053 COMPREHEN METABOLIC PANEL: CPT

## 2024-03-19 RX ORDER — AMITRIPTYLINE HYDROCHLORIDE 25 MG/1
TABLET, FILM COATED ORAL
Qty: 60 TABLET | Refills: 1 | Status: SHIPPED | OUTPATIENT
Start: 2024-03-19

## 2024-03-19 NOTE — ASSESSMENT & PLAN NOTE
Reviewed, discussed controlled consent form, will hold off signing and doing a UDS, will try other options first; reviewed jose antonio

## 2024-03-19 NOTE — PROGRESS NOTES
Chief Complaint  Leg Pain (Right leg nerve damage. Also thinks he is getting kidney stones. )    Subjective          Teo Barcenas presents to [facility]    History of Present Illness  Leg pain, Right  History of right femur fracture   Burning on bottom of right foot  Using a compounding cream: ankle be gone that ortho gave him in the past, now he is out of this   Asking about gabapentin  Worse as day progresses and occurs daily, worse on day off when he is more active    Lower left back pain   Symptoms started:2 weeks ago   Associated symptoms:none   Treatments tried:tylenol helps and lemonade have helped, thought he may have kidney stones     PAST MEDICAL HISTORY changes since 10-:     Covid +    cardiomyopathy   Melanoma: dx'd in ;      PREVENTIVE HEALTH MAINTENANCE         COLORECTAL CANCER SCREENING: Up to date (colonoscopy q10y; sigmoidoscopy q5y; Cologuard q3y) was last done 18, Results are in chart; colonoscopy with normal results   Hepatitis C Medicare Screening: was last done 18; negative      Surgical History:        Appendectomy  Cholecystectomy   melanoma;   Right hip fracture surgeries      Family History:     Father:  at age 68; Cause of death was CVA   Mother: Ovarian Cancer      Social History:   Occupation: [employer]   Marital Status:    Children: 3 children and 2 step-children              History reviewed. No pertinent past medical history.    No Known Allergies     History reviewed. No pertinent surgical history.     Social History     Tobacco Use    Smoking status: Former     Current packs/day: 0.00     Average packs/day: 1.5 packs/day for 40.0 years (60.0 ttl pk-yrs)     Types: Cigarettes     Start date:      Quit date:      Years since quittin.2     Passive exposure: Current    Smokeless tobacco: Never   Substance Use Topics    Alcohol use: Yes     Comment: 1-2 weekly        History reviewed. No  "pertinent family history.     Health Maintenance Due   Topic Date Due    BMI FOLLOWUP  Never done    COLORECTAL CANCER SCREENING  Never done    ZOSTER VACCINE (1 of 2) Never done    ANNUAL PHYSICAL  Never done    LUNG CANCER SCREENING  11/19/2022        Current Outpatient Medications on File Prior to Visit   Medication Sig    aspirin 325 MG tablet Take 1 tablet by mouth As Needed.     No current facility-administered medications on file prior to visit.       Immunization History   Administered Date(s) Administered    COVID-19 (MODERNA) 1st,2nd,3rd Dose Monovalent 03/25/2021, 04/21/2021    Flu Vaccine Intradermal Quad 18-64YR 11/24/2021    Flu Vaccine Quad PF >36MO 11/24/2021    Fluzone Quad >6mos (Multi-dose) 11/24/2021    Influenza, Unspecified 11/24/2021    Tdap 06/08/2016, 03/31/2023       Review of Systems   Constitutional:  Negative for fatigue and fever.   Respiratory:  Negative for cough and shortness of breath.    Cardiovascular:  Negative for chest pain, palpitations and leg swelling.   Genitourinary:  Negative for hematuria.   Musculoskeletal:  Positive for myalgias (having muscle spasms, right > left leg).        Objective     Vitals:    03/19/24 0903   BP: 130/87   BP Location: Left arm   Patient Position: Sitting   Cuff Size: Large Adult   Pulse: 65   Temp: 98 °F (36.7 °C)   TempSrc: Oral   Weight: 92.3 kg (203 lb 6.4 oz)   Height: 170.2 cm (67.01\")            Physical Exam  Vitals reviewed.   Constitutional:       General: He is not in acute distress.     Appearance: Normal appearance.   Neck:      Vascular: No carotid bruit.   Cardiovascular:      Rate and Rhythm: Normal rate and regular rhythm.      Heart sounds: Normal heart sounds. No murmur heard.  Pulmonary:      Effort: Pulmonary effort is normal. No respiratory distress.      Breath sounds: Normal breath sounds.   Abdominal:      Tenderness: There is no right CVA tenderness or left CVA tenderness.   Musculoskeletal:         General: No " tenderness (to lower lumbar para spinous muscles).      Right lower leg: No edema.      Left lower leg: No edema.      Comments: No pain with ROM of back, SLR negative, equal strength and reflexes in bialateral lower ext   Neurological:      Mental Status: He is alert.   Psychiatric:         Mood and Affect: Mood normal.         Behavior: Behavior normal.         Result Review :     The following data was reviewed by: MIRANDA Shi on 03/19/2024:                       Assessment and Plan      Diagnoses and all orders for this visit:    1. Leg pain, lateral, right (Primary)  Assessment & Plan:  Will try elavil first and see if this helps (reviewed some dg testing he had done in     Orders:  -     amitriptyline (ELAVIL) 25 MG tablet; 1-2 nightly  Dispense: 60 tablet; Refill: 1    2. Acute low back pain without sciatica, unspecified back pain laterality  Assessment & Plan:  Will check a urinalysis for blood, he can continue tylenol and lemonaide     Orders:  -     Urinalysis With Culture If Indicated -    3. Muscle spasms of lower extremity, unspecified laterality  Assessment & Plan:  Checking labs, make sure to drink adequate water daily     Orders:  -     Comprehensive metabolic panel; Future  -     Magnesium; Future    4. High risk medication use  Assessment & Plan:  Reviewed, discussed controlled consent form, will hold off signing and doing a UDS, will try other options first; reviewed [facility]                       Follow Up     Return if symptoms worsen or fail to improve, for followup pending lab results.    Patient was given instructions and counseling regarding his condition or for health maintenance advice. Please see specific information pulled into the AVS if appropriate.

## 2025-03-18 ENCOUNTER — LAB (OUTPATIENT)
Dept: LAB | Facility: HOSPITAL | Age: 65
End: 2025-03-18
Payer: COMMERCIAL

## 2025-03-18 ENCOUNTER — RESULTS FOLLOW-UP (OUTPATIENT)
Dept: FAMILY MEDICINE CLINIC | Age: 65
End: 2025-03-18

## 2025-03-18 ENCOUNTER — OFFICE VISIT (OUTPATIENT)
Dept: FAMILY MEDICINE CLINIC | Age: 65
End: 2025-03-18
Payer: COMMERCIAL

## 2025-03-18 VITALS
WEIGHT: 208 LBS | HEART RATE: 60 BPM | SYSTOLIC BLOOD PRESSURE: 138 MMHG | TEMPERATURE: 98 F | HEIGHT: 67 IN | OXYGEN SATURATION: 98 % | DIASTOLIC BLOOD PRESSURE: 71 MMHG | BODY MASS INDEX: 32.65 KG/M2

## 2025-03-18 DIAGNOSIS — Z12.5 SCREENING FOR PROSTATE CANCER: ICD-10-CM

## 2025-03-18 DIAGNOSIS — M25.50 ARTHRALGIA, UNSPECIFIED JOINT: ICD-10-CM

## 2025-03-18 DIAGNOSIS — Z79.899 HIGH RISK MEDICATION USE: ICD-10-CM

## 2025-03-18 DIAGNOSIS — Z00.00 ROUTINE GENERAL MEDICAL EXAMINATION AT A HEALTH CARE FACILITY: ICD-10-CM

## 2025-03-18 DIAGNOSIS — Z87.891 PERSONAL HISTORY OF NICOTINE DEPENDENCE: ICD-10-CM

## 2025-03-18 DIAGNOSIS — Z87.891 HISTORY OF NICOTINE DEPENDENCE: ICD-10-CM

## 2025-03-18 DIAGNOSIS — Z00.00 ROUTINE GENERAL MEDICAL EXAMINATION AT A HEALTH CARE FACILITY: Primary | ICD-10-CM

## 2025-03-18 DIAGNOSIS — D58.2 ELEVATED HEMOGLOBIN: Primary | ICD-10-CM

## 2025-03-18 DIAGNOSIS — Z12.11 SCREEN FOR COLON CANCER: ICD-10-CM

## 2025-03-18 DIAGNOSIS — R71.8 ELEVATED HEMATOCRIT: ICD-10-CM

## 2025-03-18 PROBLEM — J02.9 SORE THROAT: Status: RESOLVED | Noted: 2023-10-11 | Resolved: 2025-03-18

## 2025-03-18 PROBLEM — R05.9 COUGH: Status: RESOLVED | Noted: 2023-10-11 | Resolved: 2025-03-18

## 2025-03-18 LAB
ALBUMIN SERPL-MCNC: 4.1 G/DL (ref 3.5–5.2)
ALBUMIN/GLOB SERPL: 1.5 G/DL
ALP SERPL-CCNC: 111 U/L (ref 39–117)
ALT SERPL W P-5'-P-CCNC: 19 U/L (ref 1–41)
AMPHET+METHAMPHET UR QL: NEGATIVE
AMPHETAMINES UR QL: NEGATIVE
ANION GAP SERPL CALCULATED.3IONS-SCNC: 6.7 MMOL/L (ref 5–15)
AST SERPL-CCNC: 22 U/L (ref 1–40)
BARBITURATES UR QL SCN: NEGATIVE
BASOPHILS # BLD AUTO: 0.04 10*3/MM3 (ref 0–0.2)
BASOPHILS NFR BLD AUTO: 0.5 % (ref 0–1.5)
BENZODIAZ UR QL SCN: NEGATIVE
BILIRUB SERPL-MCNC: 0.4 MG/DL (ref 0–1.2)
BUN SERPL-MCNC: 13 MG/DL (ref 8–23)
BUN/CREAT SERPL: 12.6 (ref 7–25)
BUPRENORPHINE SERPL-MCNC: NEGATIVE NG/ML
CALCIUM SPEC-SCNC: 8.7 MG/DL (ref 8.6–10.5)
CANNABINOIDS SERPL QL: NEGATIVE
CHLORIDE SERPL-SCNC: 102 MMOL/L (ref 98–107)
CHOLEST SERPL-MCNC: 198 MG/DL (ref 0–200)
CHROMATIN AB SERPL-ACNC: <10 IU/ML (ref 0–14)
CO2 SERPL-SCNC: 27.3 MMOL/L (ref 22–29)
COCAINE UR QL: NEGATIVE
CREAT SERPL-MCNC: 1.03 MG/DL (ref 0.76–1.27)
CRP SERPL-MCNC: <0.3 MG/DL (ref 0–0.5)
DEPRECATED RDW RBC AUTO: 43.3 FL (ref 37–54)
EGFRCR SERPLBLD CKD-EPI 2021: 80.6 ML/MIN/1.73
EOSINOPHIL # BLD AUTO: 0.31 10*3/MM3 (ref 0–0.4)
EOSINOPHIL NFR BLD AUTO: 3.8 % (ref 0.3–6.2)
ERYTHROCYTE [DISTWIDTH] IN BLOOD BY AUTOMATED COUNT: 12.8 % (ref 12.3–15.4)
ERYTHROCYTE [SEDIMENTATION RATE] IN BLOOD: 5 MM/HR (ref 0–20)
EXPIRATION DATE: NORMAL
GLOBULIN UR ELPH-MCNC: 2.7 GM/DL
GLUCOSE SERPL-MCNC: 103 MG/DL (ref 65–99)
HCT VFR BLD AUTO: 54.5 % (ref 37.5–51)
HDLC SERPL-MCNC: 46 MG/DL (ref 40–60)
HGB BLD-MCNC: 18 G/DL (ref 13–17.7)
IMM GRANULOCYTES # BLD AUTO: 0.01 10*3/MM3 (ref 0–0.05)
IMM GRANULOCYTES NFR BLD AUTO: 0.1 % (ref 0–0.5)
LDLC SERPL CALC-MCNC: 131 MG/DL (ref 0–100)
LDLC/HDLC SERPL: 2.79 {RATIO}
LYMPHOCYTES # BLD AUTO: 1.53 10*3/MM3 (ref 0.7–3.1)
LYMPHOCYTES NFR BLD AUTO: 18.6 % (ref 19.6–45.3)
Lab: NORMAL
MCH RBC QN AUTO: 29.7 PG (ref 26.6–33)
MCHC RBC AUTO-ENTMCNC: 33 G/DL (ref 31.5–35.7)
MCV RBC AUTO: 89.8 FL (ref 79–97)
MDMA UR QL SCN: NEGATIVE
METHADONE UR QL SCN: NEGATIVE
MONOCYTES # BLD AUTO: 0.7 10*3/MM3 (ref 0.1–0.9)
MONOCYTES NFR BLD AUTO: 8.5 % (ref 5–12)
MORPHINE/OPIATES SCREEN, URINE: NEGATIVE
NEUTROPHILS NFR BLD AUTO: 5.62 10*3/MM3 (ref 1.7–7)
NEUTROPHILS NFR BLD AUTO: 68.5 % (ref 42.7–76)
OXYCODONE UR QL SCN: NEGATIVE
PCP UR QL SCN: NEGATIVE
PLATELET # BLD AUTO: 286 10*3/MM3 (ref 140–450)
PMV BLD AUTO: 9.3 FL (ref 6–12)
POTASSIUM SERPL-SCNC: 4.3 MMOL/L (ref 3.5–5.2)
PROT SERPL-MCNC: 6.8 G/DL (ref 6–8.5)
PSA SERPL-MCNC: 1.81 NG/ML (ref 0–4)
RBC # BLD AUTO: 6.07 10*6/MM3 (ref 4.14–5.8)
SODIUM SERPL-SCNC: 136 MMOL/L (ref 136–145)
TRIGL SERPL-MCNC: 118 MG/DL (ref 0–150)
TSH SERPL DL<=0.05 MIU/L-ACNC: 2.74 UIU/ML (ref 0.27–4.2)
URATE SERPL-MCNC: 5 MG/DL (ref 3.4–7)
VLDLC SERPL-MCNC: 21 MG/DL (ref 5–40)
WBC NRBC COR # BLD AUTO: 8.21 10*3/MM3 (ref 3.4–10.8)

## 2025-03-18 PROCEDURE — 85652 RBC SED RATE AUTOMATED: CPT

## 2025-03-18 PROCEDURE — 84550 ASSAY OF BLOOD/URIC ACID: CPT

## 2025-03-18 PROCEDURE — G0103 PSA SCREENING: HCPCS

## 2025-03-18 PROCEDURE — 36415 COLL VENOUS BLD VENIPUNCTURE: CPT

## 2025-03-18 PROCEDURE — 86140 C-REACTIVE PROTEIN: CPT

## 2025-03-18 PROCEDURE — 80050 GENERAL HEALTH PANEL: CPT

## 2025-03-18 PROCEDURE — 80061 LIPID PANEL: CPT

## 2025-03-18 PROCEDURE — 86431 RHEUMATOID FACTOR QUANT: CPT

## 2025-03-18 PROCEDURE — 86038 ANTINUCLEAR ANTIBODIES: CPT

## 2025-03-18 NOTE — ASSESSMENT & PLAN NOTE
Checking some labs   Will send a rx to/fax [facility] for transdermal cream (he has used in past) with Gabapentin, use as directed 1-2 pumps to affected area 3 - 4 times a day, if not improving, let me know

## 2025-03-18 NOTE — ASSESSMENT & PLAN NOTE
Advise regular exercise, healthy eating, always wear seat belts.  fall prevention discussed.  Immunizations discussed, declines any updates today, can check with his pharmacy on coverage of shingrex.   To continue yearly optometry exams and schedule yearly dental exams.

## 2025-03-18 NOTE — PROGRESS NOTES
Chief Complaint  Annual Exam (Physical ) and follow up, joint pains     Subjective          Teo Barcenas presents to [facility]    History of Present Illness  General Health Questions  Regular exercise as least 3 days a week: yes   Follows a healthy diet: working on it   Wears seat belt always: yes   Drinks Alcohol : 2 times a week   Uses Recreational drugs: none   Uses tobacco products: quit   UTD on Tetanus vaccine: UTD, last one  3-  Last PSA: normal range   Last colon screen:  Falls: fell and hit his left lower ribs, anteriorly, 4 weeks ago, improving  Dental exams: not UTD  Optometry exams: UTD, harish Day    Asked about topical gabapentin, in the past  ortho Dr Jared Castillo  gave him for ankle pains, it really helped, symptoms over 2 years, he tried OTC diclofenac topical, that did not help     PAST MEDICAL HISTORY changes since 3-:     Covid +    cardiomyopathy   Melanoma: dx'd in ;      PREVENTIVE HEALTH MAINTENANCE         COLORECTAL CANCER SCREENING: Up to date (colonoscopy q10y; sigmoidoscopy q5y; Cologuard q3y) was last done 18, Results are in chart; colonoscopy with normal results   Hepatitis C Medicare Screening: was last done 18; negative      Surgical History:      Appendectomy  Cholecystectomy   melanoma;   Right hip fracture surgeries      Family History:      Father:  at age 68; Cause of death was CVA   Mother: Ovarian Cancer,dementia   Brother: 2, 1  of lung cancer ; another dg in his early 60's with colon cancer and has pre diabetes   MGM: had colon cancer 89,  after surgery  MGF: ETOH abuse 87  PGM: pneumonia 20's   PGF:  80's     Social History:     Occupation: [employer]   Marital Status:    Children: 3 children and 2 step-children                 History reviewed. No pertinent past medical history.    No Known Allergies     History reviewed. No pertinent surgical history.      Social History     Tobacco Use    Smoking status: Former     Current packs/day: 0.00     Average packs/day: 1.5 packs/day for 40.0 years (60.0 ttl pk-yrs)     Types: Cigarettes     Start date:      Quit date:      Years since quittin.2     Passive exposure: Current    Smokeless tobacco: Never   Substance Use Topics    Alcohol use: Yes     Comment: 1-2 weekly        History reviewed. No pertinent family history.     Health Maintenance Due   Topic Date Due    BMI FOLLOWUP  Never done    Pneumococcal Vaccine 50+ (1 of 1 - PCV) Never done    ZOSTER VACCINE (1 of 2) Never done    LUNG CANCER SCREENING  2022    INFLUENZA VACCINE  2024    COVID-19 Vaccine (3 - 2024- season) 2024        Current Outpatient Medications on File Prior to Visit   Medication Sig    [DISCONTINUED] amitriptyline (ELAVIL) 25 MG tablet 1-2 nightly (Patient not taking: Reported on 3/18/2025)    [DISCONTINUED] aspirin 325 MG tablet Take 1 tablet by mouth As Needed. (Patient not taking: Reported on 3/18/2025)     No current facility-administered medications on file prior to visit.       Immunization History   Administered Date(s) Administered    COVID-19 (MODERNA) 1st,2nd,3rd Dose Monovalent 2021, 2021    Flu Vaccine Intradermal Quad 18-64YR 2021    Flu Vaccine Quad PF >36MO 2021    Fluzone Quad >6mos (Multi-dose) 2021    Influenza, Unspecified 2021    Tdap 2016, 2023       Review of Systems   Constitutional:  Negative for fatigue and fever.   HENT:  Negative for ear pain and sore throat.    Eyes:  Negative for blurred vision.   Respiratory:  Negative for cough and shortness of breath.    Cardiovascular:  Negative for chest pain, palpitations and leg swelling.   Gastrointestinal:  Negative for abdominal pain, blood in stool, constipation, diarrhea, nausea and vomiting.   Musculoskeletal:  Positive for arthralgias (takes advil). Negative for myalgias.   Skin:  Negative  "for rash.   Neurological:  Negative for dizziness, weakness and headache.   Psychiatric/Behavioral:  Negative for sleep disturbance and depressed mood.         Objective     Vitals:    03/18/25 0801   BP: 138/71   BP Location: Right arm   Patient Position: Sitting   Cuff Size: Adult   Pulse: 60   Temp: 98 °F (36.7 °C)   TempSrc: Oral   SpO2: 98%   Weight: 94.3 kg (208 lb)   Height: 170.2 cm (67.01\")            Physical Exam  Vitals reviewed.   Constitutional:       Appearance: Normal appearance. He is well-developed.   HENT:      Head: Normocephalic and atraumatic.      Right Ear: External ear normal.      Left Ear: External ear normal.      Mouth/Throat:      Pharynx: No oropharyngeal exudate.   Eyes:      Conjunctiva/sclera: Conjunctivae normal.      Pupils: Pupils are equal, round, and reactive to light.   Cardiovascular:      Rate and Rhythm: Normal rate and regular rhythm.      Heart sounds: No murmur heard.     No friction rub. No gallop.   Pulmonary:      Effort: Pulmonary effort is normal.      Breath sounds: Normal breath sounds. No wheezing or rhonchi.   Abdominal:      General: Bowel sounds are normal. There is no distension.      Palpations: Abdomen is soft.      Tenderness: There is no abdominal tenderness.      Comments:       Skin:     General: Skin is warm and dry.   Neurological:      Mental Status: He is alert and oriented to person, place, and time.      Cranial Nerves: No cranial nerve deficit.      Gait: Gait normal.   Psychiatric:         Mood and Affect: Mood and affect normal.         Behavior: Behavior normal.         Thought Content: Thought content normal.         Judgment: Judgment normal.         Result Review :     The following data was reviewed by: MIRANDA Shi on 03/18/2025:                       Assessment and Plan      Diagnoses and all orders for this visit:    1. Routine general medical examination at a health care facility (Primary)  Assessment & Plan:  Advise " regular exercise, healthy eating, always wear seat belts.  fall prevention discussed.  Immunizations discussed, declines any updates today, can check with his pharmacy on coverage of shingrex.   To continue yearly optometry exams and schedule yearly dental exams.        Orders:  -     Comprehensive Metabolic Panel; Future  -     CBC & Differential; Future  -     TSH; Future  -     Lipid Panel; Future  -     PSA Screen; Future    2. Screening for prostate cancer  Assessment & Plan:  Discussed and will screen with PSA    Orders:  -     PSA Screen; Future    3. History of nicotine dependence  Assessment & Plan:  Discussed and will screen with low dose CT of chest       4. Personal history of nicotine dependence  -      CT Chest Low Dose Cancer Screening WO; Future    5. High risk medication use  Assessment & Plan:  Reviewed, discussed and singed controlled consent form and AKILAH brady negative     Orders:  -     POC Medline 12 Panel Urine Drug Screen    6. Arthralgia, unspecified joint  Assessment & Plan:  Checking some labs   Will send a rx to/fax Rx [facility] for transdermal cream (he has used in past) with Gabapentin, use as directed 1-2 pumps to affected area 3 - 4 times a day, if not improving, let me know     Orders:  -     C-reactive Protein; Future  -     Sedimentation Rate; Future  -     BREANNE Direct Reflex to 11 Biomarker; Future  -     Uric Acid; Future  -     Rheumatoid Factor; Future    7. Screen for colon cancer  -     Ambulatory Referral to General Surgery        BMI is >= 30 and <35. (Class 1 Obesity). The following options were offered after discussion;: exercise counseling/recommendations and nutrition counseling/recommendations           Follow Up     Return if symptoms worsen or fail to improve, for followup pending lab results.    Patient was given instructions and counseling regarding his condition or for health maintenance advice. Please see specific information pulled into the AVS if  appropriate.

## 2025-03-19 PROBLEM — Z12.11 SCREEN FOR COLON CANCER: Status: ACTIVE | Noted: 2025-03-19

## 2025-03-19 LAB — ANA SER QL: NEGATIVE

## 2025-03-24 ENCOUNTER — TELEPHONE (OUTPATIENT)
Dept: FAMILY MEDICINE CLINIC | Age: 65
End: 2025-03-24
Payer: COMMERCIAL

## 2025-03-24 DIAGNOSIS — D58.2 ELEVATED HEMOGLOBIN: ICD-10-CM

## 2025-03-24 DIAGNOSIS — R71.8 ELEVATED HEMATOCRIT: Primary | ICD-10-CM

## 2025-03-24 NOTE — TELEPHONE ENCOUNTER
----- Message -----   From: Jacki Gibson RegSched Rep   Sent: 3/21/2025  10:06 AM EDT   To: MIRANDA Shi; Alexandrea Izquierdo*   Subject: REFERRAL HEMATOLOGY                              HUB ROUTING BACK TO AMBULATORY, WE NEED AT LEAST 2 SETS LABS FOR THE DX IN 9 MONTHS, PLEASE ADVISE, SRM/HUB 3-21-25

## 2025-03-24 NOTE — TELEPHONE ENCOUNTER
Loree Baeza APRN P [facility]; Gibson, Jacki, RegSched Rep  Referrals sent back hematology needs 2 labs + before they will see, so have pt repeat his CBC in a week, dg elevated hmg/hct

## 2025-04-01 ENCOUNTER — LAB (OUTPATIENT)
Dept: LAB | Facility: HOSPITAL | Age: 65
End: 2025-04-01
Payer: COMMERCIAL

## 2025-04-01 DIAGNOSIS — D58.2 ELEVATED HEMOGLOBIN: ICD-10-CM

## 2025-04-01 DIAGNOSIS — R71.8 ELEVATED HEMATOCRIT: ICD-10-CM

## 2025-04-01 LAB
BASOPHILS # BLD AUTO: 0.03 10*3/MM3 (ref 0–0.2)
BASOPHILS NFR BLD AUTO: 0.4 % (ref 0–1.5)
DEPRECATED RDW RBC AUTO: 42.7 FL (ref 37–54)
EOSINOPHIL # BLD AUTO: 0.28 10*3/MM3 (ref 0–0.4)
EOSINOPHIL NFR BLD AUTO: 3.6 % (ref 0.3–6.2)
ERYTHROCYTE [DISTWIDTH] IN BLOOD BY AUTOMATED COUNT: 12.7 % (ref 12.3–15.4)
HCT VFR BLD AUTO: 52 % (ref 37.5–51)
HGB BLD-MCNC: 17 G/DL (ref 13–17.7)
IMM GRANULOCYTES # BLD AUTO: 0.01 10*3/MM3 (ref 0–0.05)
IMM GRANULOCYTES NFR BLD AUTO: 0.1 % (ref 0–0.5)
LYMPHOCYTES # BLD AUTO: 1.61 10*3/MM3 (ref 0.7–3.1)
LYMPHOCYTES NFR BLD AUTO: 20.9 % (ref 19.6–45.3)
MCH RBC QN AUTO: 29.4 PG (ref 26.6–33)
MCHC RBC AUTO-ENTMCNC: 32.7 G/DL (ref 31.5–35.7)
MCV RBC AUTO: 89.8 FL (ref 79–97)
MONOCYTES # BLD AUTO: 0.75 10*3/MM3 (ref 0.1–0.9)
MONOCYTES NFR BLD AUTO: 9.7 % (ref 5–12)
NEUTROPHILS NFR BLD AUTO: 5.03 10*3/MM3 (ref 1.7–7)
NEUTROPHILS NFR BLD AUTO: 65.3 % (ref 42.7–76)
PLATELET # BLD AUTO: 276 10*3/MM3 (ref 140–450)
PMV BLD AUTO: 9.5 FL (ref 6–12)
RBC # BLD AUTO: 5.79 10*6/MM3 (ref 4.14–5.8)
WBC NRBC COR # BLD AUTO: 7.71 10*3/MM3 (ref 3.4–10.8)

## 2025-04-01 PROCEDURE — 36415 COLL VENOUS BLD VENIPUNCTURE: CPT

## 2025-04-01 PROCEDURE — 85025 COMPLETE CBC W/AUTO DIFF WBC: CPT

## 2025-04-02 ENCOUNTER — RESULTS FOLLOW-UP (OUTPATIENT)
Dept: LAB | Facility: HOSPITAL | Age: 65
End: 2025-04-02
Payer: COMMERCIAL

## 2025-04-02 NOTE — LETTER
Teo Barcenas  [address]  [place] [address]    April 7, 2025     Teo,     Your hemoglobin is back in the normal range.  Your hematocrit is lower and right at the normal range.  Call or follow up if you have any questions.         Resulted Orders   CBC Auto Differential   Result Value Ref Range    WBC 7.71 3.40 - 10.80 10*3/mm3    RBC 5.79 4.14 - 5.80 10*6/mm3    Hemoglobin 17.0 13.0 - 17.7 g/dL    Hematocrit 52.0 (H) 37.5 - 51.0 %    MCV 89.8 79.0 - 97.0 fL    MCH 29.4 26.6 - 33.0 pg    MCHC 32.7 31.5 - 35.7 g/dL    RDW 12.7 12.3 - 15.4 %    RDW-SD 42.7 37.0 - 54.0 fl    MPV 9.5 6.0 - 12.0 fL    Platelets 276 140 - 450 10*3/mm3    Neutrophil % 65.3 42.7 - 76.0 %    Lymphocyte % 20.9 19.6 - 45.3 %    Monocyte % 9.7 5.0 - 12.0 %    Eosinophil % 3.6 0.3 - 6.2 %    Basophil % 0.4 0.0 - 1.5 %    Immature Grans % 0.1 0.0 - 0.5 %    Neutrophils, Absolute 5.03 1.70 - 7.00 10*3/mm3    Lymphocytes, Absolute 1.61 0.70 - 3.10 10*3/mm3    Monocytes, Absolute 0.75 0.10 - 0.90 10*3/mm3    Eosinophils, Absolute 0.28 0.00 - 0.40 10*3/mm3    Basophils, Absolute 0.03 0.00 - 0.20 10*3/mm3    Immature Grans, Absolute 0.01 0.00 - 0.05 10*3/mm3                      Sincerely,        Loree Baeza, APRN

## 2025-04-09 ENCOUNTER — HOSPITAL ENCOUNTER (OUTPATIENT)
Dept: CT IMAGING | Facility: HOSPITAL | Age: 65
Discharge: HOME | End: 2025-04-09
Admitting: NURSE PRACTITIONER
Payer: COMMERCIAL

## 2025-04-09 DIAGNOSIS — Z87.891 PERSONAL HISTORY OF NICOTINE DEPENDENCE: ICD-10-CM

## 2025-04-09 PROCEDURE — 71271 CT THORAX LUNG CANCER SCR C-: CPT

## 2025-04-22 ENCOUNTER — CONSULT (OUTPATIENT)
Dept: ONCOLOGY | Facility: HOSPITAL | Age: 65
End: 2025-04-22
Payer: COMMERCIAL

## 2025-04-22 VITALS
HEART RATE: 56 BPM | SYSTOLIC BLOOD PRESSURE: 148 MMHG | TEMPERATURE: 97.6 F | OXYGEN SATURATION: 94 % | RESPIRATION RATE: 16 BRPM | HEIGHT: 67 IN | DIASTOLIC BLOOD PRESSURE: 87 MMHG | WEIGHT: 210.6 LBS | BODY MASS INDEX: 33.06 KG/M2

## 2025-04-22 DIAGNOSIS — D75.1 POLYCYTHEMIA: Primary | ICD-10-CM

## 2025-04-22 PROCEDURE — 99214 OFFICE O/P EST MOD 30 MIN: CPT | Performed by: NURSE PRACTITIONER

## 2025-04-22 PROCEDURE — G0463 HOSPITAL OUTPT CLINIC VISIT: HCPCS | Performed by: NURSE PRACTITIONER

## 2025-04-22 RX ORDER — ASPIRIN 81 MG/1
81 TABLET ORAL DAILY
COMMUNITY

## 2025-04-22 NOTE — PROGRESS NOTES
Chief Complaint/Reason for Referral:  Establish Care (Elevated hemoglobin/Elevated hematocrit)    Loree Baeza, *  Loree Baeza, APRN    Records Obtained:  Records of the patients history including those obtained from [facility] and patient information were reviewed and summarized in detail.    History of Present Illness  The patient is a very pleasant 64-year-old  male who presents for a new patient evaluation for elevated hemoglobin and hematocrit. Referral is ordered by his PCP, Loree Baeza.    He has a past medical history of elevated liver enzymes, tobacco use, melanoma diagnosed in , cardiomyopathy, and a COVID-19 infection in 2022. His surgical history includes an appendectomy and cholecystectomy. He is a former tobacco user, having quit in  after a 60-pack-year smoking history.    SOCIAL HISTORY  The patient is a former tobacco user, quit date in , 60-pack-year smoker.    FAMILY HISTORY  Mother  of ovarian cancer and dementia. Father  of CVA. Brother with 1  of lung cancer. Other brother with colon cancer. Maternal grandmother with colon cancer -  after surgery.     Results  Laboratory Studies  Lab work done in 2025 showed hemoglobin was elevated at 18, hematocrit was elevated at 54.5%, red blood cell count was 6.07. White count and platelet count were in the normal range on 2025. Repeat lab work done on 2025 showed white count normal at 7.71, hemoglobin decreased to 17.0, hematocrit decreased to 52%. Platelet count is in normal range. Differential is normal.   2025: colonoscopy: no polyps, tumors, diverticular disease, colitis or any other mucosal abnormalities were identified.        Oncology/Hematology History    No history exists.       Review of Systems   All other systems reviewed and are negative.      Current Outpatient Medications on File Prior to Visit   Medication Sig Dispense Refill    aspirin 81 MG EC tablet Take 1  "tablet by mouth Daily.       No current facility-administered medications on file prior to visit.       No Known Allergies  History reviewed. No pertinent past medical history.  Past Surgical History:   Procedure Laterality Date    COLONOSCOPY N/A 2025    marked turtuous colon / Dr Brush at [facility]     Social History     Socioeconomic History    Marital status:    Tobacco Use    Smoking status: Former     Current packs/day: 0.00     Average packs/day: 1.5 packs/day for 40.0 years (60.0 ttl pk-yrs)     Types: Cigarettes     Start date:      Quit date:      Years since quittin.3     Passive exposure: Current    Smokeless tobacco: Never   Vaping Use    Vaping status: Never Used   Substance and Sexual Activity    Alcohol use: Yes     Comment: 1-2 weekly     Drug use: Never    Sexual activity: Defer     Family History   Problem Relation Age of Onset    Ovarian cancer Mother     Colon cancer Brother     Lung cancer Brother     Colon cancer Maternal Grandmother      Immunization History   Administered Date(s) Administered    COVID-19 (MODERNA) 1st,2nd,3rd Dose Monovalent 2021, 2021    Flu Vaccine Intradermal Quad 18-64YR 2021    Flu Vaccine Quad PF >36MO 2021    Fluzone Quad >6mos (Multi-dose) 2021    Influenza, Unspecified 2021    Tdap 2016, 2023       Tobacco Use: Medium Risk (2025)    Patient History     Smoking Tobacco Use: Former     Smokeless Tobacco Use: Never     Passive Exposure: Current       Objective     Physical Exam  Vitals and nursing note reviewed.         Vitals:    25 1119   BP: 148/87   Pulse: 56   Resp: 16   Temp: 97.6 °F (36.4 °C)   TempSrc: Temporal   SpO2: 94%   Weight: 95.5 kg (210 lb 9.6 oz)   Height: 170.2 cm (67.01\")   PainSc: 0-No pain       Wt Readings from Last 3 Encounters:   25 95.5 kg (210 lb 9.6 oz)   25 94.3 kg (208 lb)   24 92.3 kg (203 lb 6.4 oz)       ECOG score: 0         ECOG: (0) " "Fully Active - Able to Carry On All Pre-disease Performance Without Restriction  Fall Risk Assessment was completed, and patient is at low risk for falls.  PHQ-9 Total Score:         The patient is  experiencing fatigue. Fatigue score: 1    PT/OT Functional Screening: PT fx screen : No needs identified  Speech Functional Screening: Speech fx screen : No needs identified  Rehab to be ordered: Rehab to be ordered : No needs identified        Result Review :  The following data was reviewed by: MIRANDA Estrella on 04/22/2025:  Lab Results   Component Value Date    HGB 17.0 04/01/2025    HCT 52.0 (H) 04/01/2025    MCV 89.8 04/01/2025     04/01/2025    WBC 7.71 04/01/2025    NEUTROABS 5.03 04/01/2025    LYMPHSABS 1.61 04/01/2025    MONOSABS 0.75 04/01/2025    EOSABS 0.28 04/01/2025    BASOSABS 0.03 04/01/2025     Lab Results   Component Value Date    GLUCOSE 103 (H) 03/18/2025    BUN 13 03/18/2025    CREATININE 1.03 03/18/2025     03/18/2025    K 4.3 03/18/2025     03/18/2025    CO2 27.3 03/18/2025    CALCIUM 8.7 03/18/2025    PROTEINTOT 6.8 03/18/2025    ALBUMIN 4.1 03/18/2025    BILITOT 0.4 03/18/2025    ALKPHOS 111 03/18/2025    AST 22 03/18/2025    ALT 19 03/18/2025     No results found for: \"LDH\", \"FERRITIN\", \"FOLATE\"  No results found for: \"IRON\", \"LABIRON\", \"TRANSFERRIN\", \"TIBC\"  No results found for: \"LDH\", \"FERRITIN\", \"DRPZWKPM70\", \"FOLATE\"  Lab Results   Component Value Date    PSA 1.810 03/18/2025            Assessment and Plan:  Diagnoses and all orders for this visit:    1. Polycythemia (Primary)  -     JAK2 V617F Qual w/Reflex to  Mutation (Integrated Oncology only)  -     JAK2 V617F Qual w/Reflex to CALR Mutation (Integrated Oncology only)  -     JAK2 V617F Qual w/Reflex to JAK2 Exon12 (Integrated Oncology only)  -     CBC & Differential  -     Comprehensive Metabolic Panel  -     Peripheral Blood Smear  -     Erythropoietin  -     Iron Profile  -     " Ferritin        Assessment & Plan  Polycythemia: elevated hemoglobin / hematocrit / red blood cell count increase of 18.0 / 54.5%, 6.07 on 3/18/2025. . hemoglobin improved on 4/1/2025 down to hemoglobin of 17.0 / hematocrit down to 52%, red blood cell count down to 5.79. Normal  WBC and normal platelet count. Normal differential. Discussed differential to includes chronic tobacco abuse,  cardiopulmonary issues, dehydration, obesity, testosterone use, and concern of myeloproliferative neoplasms. He does have history of cardiomyopathy and elevated BMI of 33.0. He is taking a baby ASA.     Denies any enlarged spleen, weight loss, night sweats or itching after a shower. He has been trying to lose weight.    Will check JULISA 2 labs to rule out polycythemia vera, ET or myelofibrosis.     Plan to follow up with NP in 6 weeks to discuss lab results and plan of care.         I spent 40 minutes caring for Teo on this date of service. This time includes time spent by me in the following activities:preparing for the visit, reviewing tests, obtaining and/or reviewing a separately obtained history, performing a medically appropriate examination and/or evaluation , counseling and educating the patient/family/caregiver, ordering medications, tests, or procedures, referring and communicating with other health care professionals , documenting information in the medical record, and independently interpreting results and communicating that information with the patient/family/caregiver    Patient Follow Up: 6 weeks with NP.     Patient was given instructions and counseling regarding his condition or for health maintenance advice. Please see specific information pulled into the AVS if appropriate.     MIRANDA Estrella    4/22/2025    .tob    Patient or patient representative verbalized consent for the use of Ambient Listening during the visit with  MIRANDA Estrella for chart documentation. 4/22/2025  12:37 EDT

## 2025-04-24 ENCOUNTER — TELEPHONE (OUTPATIENT)
Dept: ONCOLOGY | Facility: HOSPITAL | Age: 65
End: 2025-04-24
Payer: COMMERCIAL

## 2025-04-24 NOTE — TELEPHONE ENCOUNTER
SPOKE WITH PATIENT & LET HIM KNOW THE LABS ARE STILL IN REVIEW WITH HIS INSURANCE & ONCE APPROVED I WILL CALL HIM BACK

## 2025-04-24 NOTE — TELEPHONE ENCOUNTER
Caller: Teo Barcenas    Relationship: Self    Best call back number: [phone]      What was the call regarding: PATIENT CALLING TO CHECK ON STATUS OF INSURANCE APPROVAL FOR THE EXTENSIVE LABS THAT BARBIE WANTED TO DO

## 2025-05-08 ENCOUNTER — LAB (OUTPATIENT)
Dept: LAB | Facility: HOSPITAL | Age: 65
End: 2025-05-08
Payer: COMMERCIAL

## 2025-05-08 LAB
ALBUMIN SERPL-MCNC: 4.1 G/DL (ref 3.5–5.2)
ALBUMIN/GLOB SERPL: 1.4 G/DL
ALP SERPL-CCNC: 97 U/L (ref 39–117)
ALT SERPL W P-5'-P-CCNC: 23 U/L (ref 1–41)
ANION GAP SERPL CALCULATED.3IONS-SCNC: 12.2 MMOL/L (ref 5–15)
AST SERPL-CCNC: 22 U/L (ref 1–40)
BASOPHILS # BLD AUTO: 0.05 10*3/MM3 (ref 0–0.2)
BASOPHILS NFR BLD AUTO: 0.8 % (ref 0–1.5)
BILIRUB SERPL-MCNC: 0.5 MG/DL (ref 0–1.2)
BUN SERPL-MCNC: 13 MG/DL (ref 8–23)
BUN/CREAT SERPL: 15.5 (ref 7–25)
CALCIUM SPEC-SCNC: 8.9 MG/DL (ref 8.6–10.5)
CHLORIDE SERPL-SCNC: 104 MMOL/L (ref 98–107)
CO2 SERPL-SCNC: 21.8 MMOL/L (ref 22–29)
CREAT SERPL-MCNC: 0.84 MG/DL (ref 0.76–1.27)
CYTO UR: NORMAL
DEPRECATED RDW RBC AUTO: 44.2 FL (ref 37–54)
EGFRCR SERPLBLD CKD-EPI 2021: 96.8 ML/MIN/1.73
EOSINOPHIL # BLD AUTO: 0.39 10*3/MM3 (ref 0–0.4)
EOSINOPHIL NFR BLD AUTO: 6.1 % (ref 0.3–6.2)
EOSINOPHIL NFR BLD MANUAL: 6 % (ref 0.3–6.2)
ERYTHROCYTE [DISTWIDTH] IN BLOOD BY AUTOMATED COUNT: 13 % (ref 12.3–15.4)
FERRITIN SERPL-MCNC: 249.9 NG/ML (ref 30–400)
GLOBULIN UR ELPH-MCNC: 2.9 GM/DL
GLUCOSE SERPL-MCNC: 116 MG/DL (ref 65–99)
HCT VFR BLD AUTO: 52.4 % (ref 37.5–51)
HGB BLD-MCNC: 17 G/DL (ref 13–17.7)
IMM GRANULOCYTES # BLD AUTO: 0.01 10*3/MM3 (ref 0–0.05)
IMM GRANULOCYTES NFR BLD AUTO: 0.2 % (ref 0–0.5)
IRON 24H UR-MRATE: 150 MCG/DL (ref 59–158)
IRON SATN MFR SERPL: 34 % (ref 20–50)
LAB AP CASE REPORT: NORMAL
LAB AP CLINICAL INFORMATION: NORMAL
LYMPHOCYTES # BLD AUTO: 1.36 10*3/MM3 (ref 0.7–3.1)
LYMPHOCYTES # BLD MANUAL: NORMAL 10*3/UL
LYMPHOCYTES NFR BLD AUTO: 21.4 % (ref 19.6–45.3)
LYMPHOCYTES NFR BLD MANUAL: 8 % (ref 5–12)
MCH RBC QN AUTO: 29.4 PG (ref 26.6–33)
MCHC RBC AUTO-ENTMCNC: 32.4 G/DL (ref 31.5–35.7)
MCV RBC AUTO: 90.7 FL (ref 79–97)
MONOCYTES # BLD AUTO: 0.65 10*3/MM3 (ref 0.1–0.9)
MONOCYTES NFR BLD AUTO: 10.2 % (ref 5–12)
NEUTROPHILS # BLD AUTO: NORMAL 10*3/UL
NEUTROPHILS NFR BLD AUTO: 3.91 10*3/MM3 (ref 1.7–7)
NEUTROPHILS NFR BLD AUTO: 61.3 % (ref 42.7–76)
NEUTROPHILS NFR BLD MANUAL: 59 % (ref 42.7–76)
PATH REPORT.FINAL DX SPEC: NORMAL
PATH REPORT.GROSS SPEC: NORMAL
PATHOLOGY REVIEW: YES
PLAT MORPH BLD: NORMAL
PLATELET # BLD AUTO: 262 10*3/MM3 (ref 140–450)
PMV BLD AUTO: 9.1 FL (ref 6–12)
POTASSIUM SERPL-SCNC: 4.8 MMOL/L (ref 3.5–5.2)
PROT SERPL-MCNC: 7 G/DL (ref 6–8.5)
RBC # BLD AUTO: 5.78 10*6/MM3 (ref 4.14–5.8)
RBC MORPH BLD: NORMAL
SODIUM SERPL-SCNC: 138 MMOL/L (ref 136–145)
TIBC SERPL-MCNC: 440 MCG/DL (ref 298–536)
TRANSFERRIN SERPL-MCNC: 295 MG/DL (ref 200–360)
VARIANT LYMPHS NFR BLD MANUAL: 1 % (ref 0–5)
VARIANT LYMPHS NFR BLD MANUAL: 26 % (ref 19.6–45.3)
WBC MORPH BLD: NORMAL
WBC NRBC COR # BLD AUTO: 6.37 10*3/MM3 (ref 3.4–10.8)

## 2025-05-08 PROCEDURE — 80053 COMPREHEN METABOLIC PANEL: CPT | Performed by: NURSE PRACTITIONER

## 2025-05-08 PROCEDURE — 81219 CALR GENE COM VARIANTS: CPT | Performed by: NURSE PRACTITIONER

## 2025-05-08 PROCEDURE — 85025 COMPLETE CBC W/AUTO DIFF WBC: CPT | Performed by: NURSE PRACTITIONER

## 2025-05-08 PROCEDURE — 84466 ASSAY OF TRANSFERRIN: CPT | Performed by: NURSE PRACTITIONER

## 2025-05-08 PROCEDURE — 82728 ASSAY OF FERRITIN: CPT | Performed by: NURSE PRACTITIONER

## 2025-05-08 PROCEDURE — 83540 ASSAY OF IRON: CPT | Performed by: NURSE PRACTITIONER

## 2025-05-08 PROCEDURE — 82668 ASSAY OF ERYTHROPOIETIN: CPT | Performed by: NURSE PRACTITIONER

## 2025-05-08 PROCEDURE — 36415 COLL VENOUS BLD VENIPUNCTURE: CPT | Performed by: NURSE PRACTITIONER

## 2025-05-12 LAB
EPO SERPL-ACNC: 6.5 MIU/ML (ref 2.6–18.5)
JAK2 V617F RESULT: NORMAL

## 2025-05-13 LAB — CALR MUTATION ANALYSIS RESULT: NORMAL

## 2025-05-15 ENCOUNTER — TELEPHONE (OUTPATIENT)
Dept: FAMILY MEDICINE CLINIC | Age: 65
End: 2025-05-15
Payer: COMMERCIAL

## 2025-05-15 NOTE — TELEPHONE ENCOUNTER
In my note on 3-18-25 it says:    Will send a rx to/fax [facility] for transdermal cream (he has used in past) with Gabapentin, use as directed 1-2 pumps to affected area 3 - 4 times a day, if not improving, let me know      See what happened with this

## 2025-05-15 NOTE — TELEPHONE ENCOUNTER
Yes, can you call and check to see if they received or if resend see if they receive or see if we need to send elsewhere, does someone else do this compounding

## 2025-05-15 NOTE — TELEPHONE ENCOUNTER
Caller: Teo Barcenas    Relationship: Self    Best call back number: [phone]    What is the best time to reach you: ANYTIME    Who are you requesting to speak with (clinical staff, provider,  specific staff member): NURSE OR DR. HODA HAMPTON       What was the call regarding: PATIENT IS CALLING CHECKING TO SEE IF PCP EVER FOUND OUT ANYTHING REGARDING THE GABAPENTIN CREAM.  WHEN HE WAS IN IT WAS DISCUSSED AND SHE NEEDED TO FIND OUT WHAT WAS IN IT FROM [facility] [place].  PLEASE ADVISE.

## 2025-05-19 LAB
JAK2 EXONS 12-15 BY PCR RESULT: NORMAL
MPL MUTATION RESULT: NORMAL

## 2025-06-06 ENCOUNTER — OFFICE VISIT (OUTPATIENT)
Dept: ONCOLOGY | Facility: HOSPITAL | Age: 65
End: 2025-06-06
Payer: COMMERCIAL

## 2025-06-06 VITALS
TEMPERATURE: 98 F | DIASTOLIC BLOOD PRESSURE: 72 MMHG | OXYGEN SATURATION: 97 % | BODY MASS INDEX: 32.93 KG/M2 | SYSTOLIC BLOOD PRESSURE: 144 MMHG | HEART RATE: 55 BPM | HEIGHT: 67 IN | RESPIRATION RATE: 16 BRPM | WEIGHT: 209.8 LBS

## 2025-06-06 DIAGNOSIS — D75.1 POLYCYTHEMIA: Primary | ICD-10-CM

## 2025-06-06 NOTE — PROGRESS NOTES
Chief Complaint/Reason for Referral:   Elevated hemoglobin/Elevated hematocrit    Loree Baeza, *  Loree Baeza, APRN    Records Obtained:  Records of the patients history including those [facility] and patient information were reviewed and summarized in detail.    Subjective    History of Present Illness  The patient is a very pleasant 64-year-old  male who presents for follow-up for lab results for polycythemia. He has a history of cardiomyopathy, elevated liver enzymes, tobacco use, and melanoma diagnosed in 2012. He is a former tobacco user with a 60-pack-year smoking history, having quit in 2011. Reports previously drank alcohol and then discontinued his alcohol use. He reports was told previously EF was 45-50% and last check was told was improved. Its been several years since he has seen cardiology. He does not take any daily medications other than a baby ASA when he remembers to. He is anticipating shelter [place] [facility].     He is unsure if he snores at nite as his wife snores he says. Denies any lower extremity swelling. Denies any cough / SOA or wheezing. Denies any CP.     We reviewed his lab work today from April.     SOCIAL HISTORY  The patient is a former tobacco user of 60-pack-year smoker. He quit in 2011.    Results   5/8/2025: Laboratory Studies  Lab work previously showed an elevated hemoglobin and hematocrit of 18 and hematocrit of 54.5% in March 2025. Erythropoietin was 6.5.    Testing  5/8/2025: He was evaluated for polycythemia vera and myeloproliferative neoplasms, and all the testing was negative with a negative JAK2, JAK2 1215 mutation was negative, MPL was negative, CALR was negative.       Oncology/Hematology History    No history exists.       Review of Systems   Constitutional: Negative.    HENT: Negative.     Eyes: Negative.    Respiratory: Negative.     Cardiovascular: Negative.    Gastrointestinal: Negative.    Endocrine: Negative.     Genitourinary: Negative.    Musculoskeletal: Negative.    Allergic/Immunologic: Negative.    Neurological: Negative.    Hematological: Negative.    Psychiatric/Behavioral: Negative.         Current Outpatient Medications on File Prior to Visit   Medication Sig Dispense Refill    aspirin 81 MG EC tablet Take 1 tablet by mouth Daily.       No current facility-administered medications on file prior to visit.       No Known Allergies  History reviewed. No pertinent past medical history.  Past Surgical History:   Procedure Laterality Date    COLONOSCOPY N/A 2025    marked turtuous colon / Dr Brush at [facility]     Social History     Socioeconomic History    Marital status:    Tobacco Use    Smoking status: Former     Current packs/day: 0.00     Average packs/day: 1.5 packs/day for 40.0 years (60.0 ttl pk-yrs)     Types: Cigarettes     Start date:      Quit date:      Years since quittin.4     Passive exposure: Current    Smokeless tobacco: Never   Vaping Use    Vaping status: Never Used   Substance and Sexual Activity    Alcohol use: Yes     Comment: 1-2 weekly     Drug use: Never    Sexual activity: Defer     Family History   Problem Relation Age of Onset    Ovarian cancer Mother     Colon cancer Brother     Lung cancer Brother     Colon cancer Maternal Grandmother      Immunization History   Administered Date(s) Administered    COVID-19 (MODERNA) 1st,2nd,3rd Dose Monovalent 2021, 2021    Flu Vaccine Intradermal Quad 18-64YR 2021    Flu Vaccine Quad PF >36MO 2021    Fluzone Quad >6mos (Multi-dose) 2021    Influenza, Unspecified 2021    Tdap 2016, 2023       Tobacco Use: Medium Risk (2025)    Patient History     Smoking Tobacco Use: Former     Smokeless Tobacco Use: Never     Passive Exposure: Current       Objective     Physical Exam  Vitals and nursing note reviewed.   Constitutional:       General: He is not in acute distress.     Appearance:  "Normal appearance.   Cardiovascular:      Rate and Rhythm: Normal rate and regular rhythm.      Pulses: Normal pulses.      Heart sounds: Normal heart sounds. No murmur heard.  Pulmonary:      Effort: Pulmonary effort is normal. No respiratory distress.      Breath sounds: Normal breath sounds. No wheezing, rhonchi or rales.   Musculoskeletal:      Right lower leg: No edema.      Left lower leg: No edema.   Skin:     Findings: No bruising, lesion or rash.   Neurological:      Mental Status: He is alert.   Psychiatric:         Mood and Affect: Mood normal.         Vitals:    06/06/25 0942   BP: 144/72   Pulse: 55   Resp: 16   Temp: 98 °F (36.7 °C)   TempSrc: Temporal   SpO2: 97%   Weight: 95.2 kg (209 lb 12.8 oz)   Height: 170.2 cm (67.01\")   PainSc: 0-No pain       Wt Readings from Last 3 Encounters:   06/06/25 95.2 kg (209 lb 12.8 oz)   04/22/25 95.5 kg (210 lb 9.6 oz)   03/18/25 94.3 kg (208 lb)      ECOG score: 0         ECOG: (0) Fully Active - Able to Carry On All Pre-disease Performance Without Restriction  Fall Risk Assessment was completed, and patient is at low risk for falls.  PHQ-9 Total Score:         The patient is  experiencing fatigue. Fatigue score: 1    PT/OT Functional Screening: PT fx screen : No needs identified  Speech Functional Screening: Speech fx screen : No needs identified  Rehab to be ordered: Rehab to be ordered : No needs identified        Result Review :  The following data was reviewed by: MIRANDA Estrella on 06/06/2025:  Lab Results   Component Value Date    HGB 17.0 05/08/2025    HCT 52.4 (H) 05/08/2025    MCV 90.7 05/08/2025     05/08/2025    WBC 6.37 05/08/2025    NEUTROABS 3.91 05/08/2025    LYMPHSABS 1.36 05/08/2025    MONOSABS 0.65 05/08/2025    EOSABS 0.39 05/08/2025    BASOSABS 0.05 05/08/2025     Lab Results   Component Value Date    GLUCOSE 116 (H) 05/08/2025    BUN 13 05/08/2025    CREATININE 0.84 05/08/2025     05/08/2025    K 4.8 05/08/2025    CL " 104 05/08/2025    CO2 21.8 (L) 05/08/2025    CALCIUM 8.9 05/08/2025    PROTEINTOT 7.0 05/08/2025    ALBUMIN 4.1 05/08/2025    BILITOT 0.5 05/08/2025    ALKPHOS 97 05/08/2025    AST 22 05/08/2025    ALT 23 05/08/2025     Lab Results   Component Value Date    Ferritin 249.90 05/08/2025     Lab Results   Component Value Date    IRON 150 05/08/2025    LABIRON 34 05/08/2025    TRANSFERRIN 295 05/08/2025    TIBC 440 05/08/2025     Lab Results   Component Value Date    FERRITIN 249.90 05/08/2025     Lab Results   Component Value Date    PSA 1.810 03/18/2025          Assessment and Plan:  Diagnoses and all orders for this visit:    1. Polycythemia (Primary)  -     CBC & Differential; Future  -     Comprehensive Metabolic Panel; Future      Secondary polycythemia: Negative for any MPN with negative JULISA 2 V617F, negative exon 12-15, negative Milton-R, negative MPL mutation. Erythropoietin was normal range. Lab work from May with normal hemoglobin of 17.0 and hematocrit of 52.4% down from 54.5% in March. No need for phlebotomy at this time. Denies any HA, CP, SOA or lower extremity swelling.     Encourage adequate hydration and weight control. He is non-smoker now. Low dose CT scan in 4/9/2025 was benign.         I spent 30 minutes caring for Teo on this date of service. This time includes time spent by me in the following activities:preparing for the visit, reviewing tests, obtaining and/or reviewing a separately obtained history, performing a medically appropriate examination and/or evaluation , counseling and educating the patient/family/caregiver, ordering medications, tests, or procedures, referring and communicating with other health care professionals , documenting information in the medical record, and independently interpreting results and communicating that information with the patient/family/caregiver    Patient Follow Up: labs in 6 months and follow up with NP.     Patient was given instructions and counseling  regarding his condition or for health maintenance advice. Please see specific information pulled into the AVS if appropriate.     MIRANDA Estrella    6/6/2025    .tob    Patient or patient representative verbalized consent for the use of Ambient Listening during the visit with  MIRANDA Estrella for chart documentation. 6/6/2025  10:17 EDT

## 2025-07-16 ENCOUNTER — OFFICE VISIT (OUTPATIENT)
Dept: FAMILY MEDICINE CLINIC | Age: 65
End: 2025-07-16
Payer: COMMERCIAL

## 2025-07-16 VITALS
HEIGHT: 67 IN | SYSTOLIC BLOOD PRESSURE: 144 MMHG | DIASTOLIC BLOOD PRESSURE: 92 MMHG | HEART RATE: 70 BPM | BODY MASS INDEX: 32.65 KG/M2 | TEMPERATURE: 98.2 F | WEIGHT: 208 LBS | OXYGEN SATURATION: 95 %

## 2025-07-16 DIAGNOSIS — Z87.81 HISTORY OF FRACTURE OF RIGHT HIP: Primary | ICD-10-CM

## 2025-07-16 DIAGNOSIS — R03.0 ELEVATED BLOOD PRESSURE READING: ICD-10-CM

## 2025-07-16 DIAGNOSIS — R71.8 ELEVATED HEMATOCRIT: ICD-10-CM

## 2025-07-16 RX ORDER — ETODOLAC 400 MG/1
400 TABLET, FILM COATED ORAL 2 TIMES DAILY WITH MEALS
Qty: 60 TABLET | Refills: 1 | Status: SHIPPED | OUTPATIENT
Start: 2025-07-16

## 2025-07-16 NOTE — ASSESSMENT & PLAN NOTE
Will send to ortho for a consult, reviewed previous x-ray's, repeat x-ray of right hip, he will return later this week for the x-ray, trial of Lodine with food   Orders:    Ambulatory Referral to Orthopedic Surgery    XR Hip With or Without Pelvis 2 - 3 View Right; Future    etodolac (LODINE) 400 MG tablet; Take 1 tablet by mouth 2 (Two) Times a Day With Meals.

## 2025-07-16 NOTE — ASSESSMENT & PLAN NOTE
He had abnormal labs earlier this year, had repeat labs and was seen at hem/onc, has pending lab orders and a follow up with her, keep those appt's
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
## 2025-07-17 ENCOUNTER — HOSPITAL ENCOUNTER (OUTPATIENT)
Dept: GENERAL RADIOLOGY | Facility: HOSPITAL | Age: 65
Discharge: HOME | End: 2025-07-17
Admitting: NURSE PRACTITIONER
Payer: COMMERCIAL

## 2025-07-17 DIAGNOSIS — Z87.81 HISTORY OF FRACTURE OF RIGHT HIP: ICD-10-CM

## 2025-07-17 PROCEDURE — 73502 X-RAY EXAM HIP UNI 2-3 VIEWS: CPT

## 2025-07-22 ENCOUNTER — RESULTS FOLLOW-UP (OUTPATIENT)
Dept: FAMILY MEDICINE CLINIC | Age: 65
End: 2025-07-22
Payer: COMMERCIAL

## 2025-07-22 NOTE — LETTER
Teo Barcenas  [address]  [place] [facility] [address]    July 22, 2025     Teo,     Your hip x-ray indicates post surgical changes and some arthritis.  Our office is scheduling you with an orthopedist to follow up with regarding your hip pain.      Below are the results from your recent visit:    Resulted Orders   XR Hip With or Without Pelvis 2 - 3 View Right    Narrative    XR HIP W OR WO PELVIS 2-3 VIEW RIGHT    Date of Exam: 7/17/2025 10:50 AM EDT    Indication: right hip pain, previous surgery    Comparison: None available.    Findings:  Evaluation of the pelvis and right hip demonstrates postoperative changes with surgical fixation of a right acetabular/pubic ramus fracture with malleable surgical plate and screws. The femoral head appears well-seated within the acetabulum. No definite   cortical irregularity or trabecular disruption noted. There is suspected joint space narrowing somewhat obscured by overlying surgical hardware. Subchondral cystic changes present along the lateral femoral head and within the acetabulum. The left femoral   head is well-seated within its respective acetabulum. No acute osseous injury noted.      Impression    Impression:  Postsurgical changes along the right hemipelvis.    Right hip degenerative arthrosis.      Electronically Signed: Sujata Reynolds MD    7/21/2025 4:50 PM EDT    Workstation ID: [id number]               If you have any questions or concerns, please don't hesitate to call.         Sincerely,        MIRANDA Shi

## 2025-07-25 ENCOUNTER — OFFICE VISIT (OUTPATIENT)
Dept: ORTHOPEDIC SURGERY | Facility: CLINIC | Age: 65
End: 2025-07-25
Payer: COMMERCIAL

## 2025-07-25 VITALS — HEIGHT: 67 IN | BODY MASS INDEX: 34.12 KG/M2 | WEIGHT: 217.4 LBS

## 2025-07-25 DIAGNOSIS — M16.11 ARTHRITIS OF RIGHT HIP: Primary | ICD-10-CM
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
